# Patient Record
Sex: MALE | Race: WHITE | NOT HISPANIC OR LATINO | Employment: OTHER | URBAN - METROPOLITAN AREA
[De-identification: names, ages, dates, MRNs, and addresses within clinical notes are randomized per-mention and may not be internally consistent; named-entity substitution may affect disease eponyms.]

---

## 2018-04-27 ENCOUNTER — OFFICE VISIT (OUTPATIENT)
Dept: FAMILY MEDICINE | Facility: CLINIC | Age: 63
End: 2018-04-27
Payer: MEDICAID

## 2018-04-27 VITALS
TEMPERATURE: 97 F | OXYGEN SATURATION: 99 % | BODY MASS INDEX: 20.4 KG/M2 | WEIGHT: 130 LBS | DIASTOLIC BLOOD PRESSURE: 86 MMHG | HEART RATE: 94 BPM | SYSTOLIC BLOOD PRESSURE: 153 MMHG | HEIGHT: 67 IN | RESPIRATION RATE: 18 BRPM

## 2018-04-27 DIAGNOSIS — I10 HYPERTENSION, UNSPECIFIED TYPE: Primary | ICD-10-CM

## 2018-04-27 PROCEDURE — 99213 OFFICE O/P EST LOW 20 MIN: CPT | Mod: S$PBB,,, | Performed by: NURSE PRACTITIONER

## 2018-04-27 PROCEDURE — 99999 PR PBB SHADOW E&M-EST. PATIENT-LVL III: CPT | Mod: PBBFAC,,, | Performed by: NURSE PRACTITIONER

## 2018-04-27 PROCEDURE — 99213 OFFICE O/P EST LOW 20 MIN: CPT | Mod: PBBFAC,PN | Performed by: NURSE PRACTITIONER

## 2018-04-27 RX ORDER — LISINOPRIL 20 MG/1
20 TABLET ORAL DAILY
Qty: 90 TABLET | Refills: 3 | Status: SHIPPED | OUTPATIENT
Start: 2018-04-27 | End: 2019-08-19 | Stop reason: SDUPTHER

## 2018-04-27 NOTE — PROGRESS NOTES
Subjective:       Patient ID: Zhao Lee Jr. is a 62 y.o. male.    Chief Complaint: Follow-up (blood pressure)    Patient in clinic today with a complaint of multiple elevated blood pressure readings over the course of the last month.  He reports systolic pressures >170.        Review of Systems   Constitutional: Negative for activity change, appetite change, chills, diaphoresis, fatigue, fever and unexpected weight change.   HENT: Negative.    Eyes: Negative.    Respiratory: Negative for cough, choking, chest tightness, shortness of breath, wheezing and stridor.    Cardiovascular: Negative for chest pain, palpitations and leg swelling.   Gastrointestinal: Negative for abdominal pain, constipation, diarrhea, nausea and vomiting.   Endocrine: Negative for cold intolerance, heat intolerance, polydipsia, polyphagia and polyuria.   Genitourinary: Negative.    Musculoskeletal: Positive for arthralgias, back pain, gait problem, myalgias and neck pain. Negative for joint swelling and neck stiffness.   Skin: Positive for pallor. Negative for rash and wound.   Allergic/Immunologic: Negative.    Neurological: Negative for dizziness, tremors, syncope, weakness, light-headedness, numbness and headaches.   Hematological: Negative for adenopathy. Does not bruise/bleed easily.   Psychiatric/Behavioral: Negative.        Objective:      Physical Exam   Constitutional: He is oriented to person, place, and time. He appears well-developed.   HENT:   Head: Normocephalic and atraumatic.   Eyes: Conjunctivae are normal. Pupils are equal, round, and reactive to light.   Neck: Normal range of motion. Neck supple.   Cardiovascular: Normal rate and regular rhythm.    Pulmonary/Chest: Effort normal and breath sounds normal.   Abdominal: Soft.   Musculoskeletal:   Limited ROM secondary to chronic conditions, uses wheelchair for mobility.   Neurological: He is alert and oriented to person, place, and time.   Skin: Skin is warm and dry.  Capillary refill takes less than 2 seconds.   Psychiatric: He has a normal mood and affect. His behavior is normal.   Nursing note and vitals reviewed.      Assessment:       1. Hypertension, unspecified type        Plan:       Discussed plan of care with patient.  We will proceed with treatment, as prescribed.  Encouraged continued monitoring of his blood pressure and continued follow up with Dr. Gibson.

## 2018-05-03 ENCOUNTER — TELEPHONE (OUTPATIENT)
Dept: PODIATRY | Facility: CLINIC | Age: 63
End: 2018-05-03

## 2018-05-03 NOTE — TELEPHONE ENCOUNTER
----- Message from Dara Wilkins sent at 5/3/2018  8:44 AM CDT -----  Contact: self [phone]  Type:  Sooner Apoointment Request    Caller is requesting a sooner appointment.  Caller declined first available appointment listed below.  Caller will not accept being placed on the waitlist and is requesting a message be sent to doctor.    Name of Caller:  Self   When is the first available appointment?  05/14/2018  Symptoms:  NA  Best Call Back Number: [phone]  Additional Information:  Patient asking to be seen earlier appointment for amputation follow up .

## 2018-05-09 ENCOUNTER — OFFICE VISIT (OUTPATIENT)
Dept: PODIATRY | Facility: CLINIC | Age: 63
End: 2018-05-09
Payer: MEDICAID

## 2018-05-09 VITALS
BODY MASS INDEX: 20.4 KG/M2 | HEIGHT: 67 IN | WEIGHT: 130 LBS | TEMPERATURE: 98 F | DIASTOLIC BLOOD PRESSURE: 83 MMHG | HEART RATE: 72 BPM | SYSTOLIC BLOOD PRESSURE: 154 MMHG

## 2018-05-09 DIAGNOSIS — M19.072 DJD (DEGENERATIVE JOINT DISEASE), ANKLE AND FOOT, LEFT: ICD-10-CM

## 2018-05-09 DIAGNOSIS — L97.521 SKIN ULCER OF TOE OF LEFT FOOT, LIMITED TO BREAKDOWN OF SKIN: Primary | ICD-10-CM

## 2018-05-09 DIAGNOSIS — I73.9 PERIPHERAL VASCULAR DISEASE: ICD-10-CM

## 2018-05-09 PROCEDURE — 99213 OFFICE O/P EST LOW 20 MIN: CPT | Mod: PBBFAC | Performed by: PODIATRIST

## 2018-05-09 PROCEDURE — 99213 OFFICE O/P EST LOW 20 MIN: CPT | Mod: S$PBB,,, | Performed by: PODIATRIST

## 2018-05-09 PROCEDURE — 99999 PR PBB SHADOW E&M-EST. PATIENT-LVL III: CPT | Mod: PBBFAC,,, | Performed by: PODIATRIST

## 2018-05-09 RX ORDER — DIAZEPAM 10 MG/1
TABLET ORAL
COMMUNITY
Start: 2018-04-17 | End: 2018-06-25 | Stop reason: SDUPTHER

## 2018-05-09 RX ORDER — OXYCODONE HYDROCHLORIDE 10 MG/1
TABLET ORAL
COMMUNITY
Start: 2018-04-17 | End: 2018-10-24

## 2018-05-09 RX ORDER — DIAZEPAM 10 MG/1
TABLET ORAL
COMMUNITY
End: 2018-06-25

## 2018-05-09 RX ORDER — FENTANYL 12.5 UG/1
PATCH TRANSDERMAL
COMMUNITY
End: 2018-10-24

## 2018-05-09 RX ORDER — ESCITALOPRAM OXALATE 10 MG/1
TABLET ORAL
COMMUNITY
End: 2018-07-03 | Stop reason: SDUPTHER

## 2018-05-09 RX ORDER — GABAPENTIN 300 MG/1
CAPSULE ORAL
COMMUNITY
End: 2018-10-24 | Stop reason: SDUPTHER

## 2018-05-09 RX ORDER — VIT C/E/ZN/COPPR/LUTEIN/ZEAXAN 250MG-90MG
CAPSULE ORAL
COMMUNITY
End: 2019-04-24

## 2018-05-09 RX ORDER — SODIUM BICARBONATE 650 MG/1
650 TABLET ORAL 4 TIMES DAILY
COMMUNITY
End: 2018-10-08 | Stop reason: SDUPTHER

## 2018-05-09 NOTE — LETTER
May 12, 2018      Tobi Salvador MD  [address]  [facility] [place] [address]           [facility]  [address] [facility] [place] [address]  Phone: [phone]  Fax: [phone]          Patient: Zhao Lee Jr.   MR Number: [id number]   YOB: 1955   Date of Visit: 5/9/2018       Dear Dr. Tobi Salvador:    Thank you for referring Zhao Lee to me for evaluation. Attached you will find relevant portions of my assessment and plan of care.    If you have questions, please do not hesitate to call me. I look forward to following Zhao Lee along with you.    Sincerely,    Brad Ma, SONIA    Enclosure  CC:  No Recipients    If you would like to receive this communication electronically, please contact [email or web address] or [phone] to request more information on [facility] access.    For providers and/or their staff who would like to refer a patient to [facility], please contact us through our one-stop-shop provider referral line, [facility] [place], at [phone].    If you feel you have received this communication in error or would no longer like to receive these types of communications, please e-mail [email or web address]

## 2018-05-11 ENCOUNTER — LAB VISIT (OUTPATIENT)
Dept: LAB | Facility: HOSPITAL | Age: 63
End: 2018-05-11
Attending: FAMILY MEDICINE
Payer: MEDICAID

## 2018-05-11 ENCOUNTER — TELEPHONE (OUTPATIENT)
Dept: FAMILY MEDICINE | Facility: CLINIC | Age: 63
End: 2018-05-11

## 2018-05-11 ENCOUNTER — OFFICE VISIT (OUTPATIENT)
Dept: FAMILY MEDICINE | Facility: CLINIC | Age: 63
End: 2018-05-11
Payer: MEDICAID

## 2018-05-11 VITALS
OXYGEN SATURATION: 100 % | DIASTOLIC BLOOD PRESSURE: 79 MMHG | RESPIRATION RATE: 18 BRPM | SYSTOLIC BLOOD PRESSURE: 177 MMHG | HEART RATE: 83 BPM

## 2018-05-11 DIAGNOSIS — I10 ESSENTIAL HYPERTENSION: ICD-10-CM

## 2018-05-11 DIAGNOSIS — M47.816 SPONDYLOSIS OF LUMBAR REGION WITHOUT MYELOPATHY OR RADICULOPATHY: ICD-10-CM

## 2018-05-11 DIAGNOSIS — R39.81 FUNCTIONAL URINARY INCONTINENCE: ICD-10-CM

## 2018-05-11 DIAGNOSIS — G82.20 PARAPLEGIA: ICD-10-CM

## 2018-05-11 DIAGNOSIS — J30.1 SEASONAL ALLERGIC RHINITIS DUE TO POLLEN: Primary | ICD-10-CM

## 2018-05-11 LAB
ANION GAP SERPL CALC-SCNC: 10 MMOL/L
BASOPHILS # BLD AUTO: 0.08 K/UL
BASOPHILS NFR BLD: 1.7 %
BUN SERPL-MCNC: 27 MG/DL
CALCIUM SERPL-MCNC: 8.6 MG/DL
CHLORIDE SERPL-SCNC: 103 MMOL/L
CO2 SERPL-SCNC: 21 MMOL/L
CREAT SERPL-MCNC: 2.5 MG/DL
DIFFERENTIAL METHOD: ABNORMAL
EOSINOPHIL # BLD AUTO: 0.2 K/UL
EOSINOPHIL NFR BLD: 3.2 %
ERYTHROCYTE [DISTWIDTH] IN BLOOD BY AUTOMATED COUNT: 13.2 %
EST. GFR  (AFRICAN AMERICAN): 30.2 ML/MIN/1.73 M^2
EST. GFR  (NON AFRICAN AMERICAN): 26.2 ML/MIN/1.73 M^2
GLUCOSE SERPL-MCNC: 89 MG/DL
HCT VFR BLD AUTO: 42.9 %
HGB BLD-MCNC: 13.9 G/DL
IMM GRANULOCYTES # BLD AUTO: 0.01 K/UL
IMM GRANULOCYTES NFR BLD AUTO: 0.2 %
LYMPHOCYTES # BLD AUTO: 1.6 K/UL
LYMPHOCYTES NFR BLD: 34.4 %
MCH RBC QN AUTO: 34.1 PG
MCHC RBC AUTO-ENTMCNC: 32.4 G/DL
MCV RBC AUTO: 105 FL
MONOCYTES # BLD AUTO: 0.4 K/UL
MONOCYTES NFR BLD: 8.6 %
NEUTROPHILS # BLD AUTO: 2.5 K/UL
NEUTROPHILS NFR BLD: 51.9 %
NRBC BLD-RTO: 0 /100 WBC
PLATELET # BLD AUTO: 268 K/UL
PMV BLD AUTO: 9.2 FL
POTASSIUM SERPL-SCNC: 4.7 MMOL/L
RBC # BLD AUTO: 4.08 M/UL
SODIUM SERPL-SCNC: 134 MMOL/L
WBC # BLD AUTO: 4.74 K/UL

## 2018-05-11 PROCEDURE — 99999 PR PBB SHADOW E&M-EST. PATIENT-LVL III: CPT | Mod: PBBFAC,,, | Performed by: FAMILY MEDICINE

## 2018-05-11 PROCEDURE — 36415 COLL VENOUS BLD VENIPUNCTURE: CPT

## 2018-05-11 PROCEDURE — 99214 OFFICE O/P EST MOD 30 MIN: CPT | Mod: ,,, | Performed by: FAMILY MEDICINE

## 2018-05-11 PROCEDURE — 80048 BASIC METABOLIC PNL TOTAL CA: CPT

## 2018-05-11 PROCEDURE — 85025 COMPLETE CBC W/AUTO DIFF WBC: CPT

## 2018-05-11 RX ORDER — FLUTICASONE PROPIONATE 50 MCG
1 SPRAY, SUSPENSION (ML) NASAL DAILY
Qty: 1 BOTTLE | Refills: 11 | Status: SHIPPED | OUTPATIENT
Start: 2018-05-11 | End: 2018-10-24

## 2018-05-11 NOTE — TELEPHONE ENCOUNTER
----- Message from Dara Wilkins sent at 5/11/2018 12:10 PM CDT -----  Contact: [employer]- [place] [phone]  Type: Needs Medical Advice    Who Called:  [employer] [employer]  Symptoms (please be specific):  NA How long has patient had these symptoms:  NA   Pharmacy name and phone #:    Best Call Back Number: [phone] Information: the representative need diagnosis code for catheter supplies. Patient has been out of catheter supplies since 05/09/2018

## 2018-05-11 NOTE — PROGRESS NOTES
Subjective:       Patient ID: Zhao Lee Jr. is a 62 y.o. male.    Chief Complaint: Follow-up (pain management referral ); Tinnitus; and Nasal Congestion    In regards to the patient's hypertension, patient denies chest pain/sob, and has not been compliant with the medication regimen due to the following: forgot to take medicine.    Also complains of AR, not controlled with OTC meds.     Also complains of urinary incontinence due to spinal injury, in and out caths. Uses size 14 catheter, due to paraplegia.      Review of Systems   Constitutional: Negative for activity change, appetite change, fatigue and fever.   HENT: Positive for postnasal drip and rhinorrhea. Negative for congestion, dental problem, ear discharge, hearing loss, sinus pain, sneezing and trouble swallowing.    Eyes: Negative for photophobia and discharge.   Respiratory: Positive for cough. Negative for apnea and shortness of breath.    Cardiovascular: Negative for chest pain.   Gastrointestinal: Negative for abdominal distention, abdominal pain, blood in stool, diarrhea, nausea and vomiting.   Endocrine: Negative for cold intolerance.   Genitourinary: Negative for difficulty urinating, flank pain, frequency, hematuria and testicular pain.   Musculoskeletal: Positive for arthralgias, back pain and myalgias.   Skin: Negative for color change.   Allergic/Immunologic: Negative for environmental allergies, food allergies and immunocompromised state.   Neurological: Positive for numbness. Negative for dizziness, syncope and headaches.   Hematological: Negative for adenopathy. Does not bruise/bleed easily.   Psychiatric/Behavioral: Negative for agitation, confusion, decreased concentration, hallucinations, self-injury and sleep disturbance.   All other systems reviewed and are negative.        Reviewed family, medical, surgical, and social history.    Objective:      BP (!) 177/79 (BP Location: Left arm, Patient Position: Sitting)   Pulse 83   Resp  18   SpO2 100%   Physical Exam   Constitutional: He is oriented to person, place, and time. No distress.   HENT:   Head: Normocephalic and atraumatic.   Nose: Nose normal.   Mouth/Throat: Oropharynx is clear and moist. No oropharyngeal exudate.   Eyes: Conjunctivae and EOM are normal. Pupils are equal, round, and reactive to light.   Neck: Normal range of motion. No thyromegaly present.   Cardiovascular: Normal rate, regular rhythm, normal heart sounds and intact distal pulses.  Exam reveals no gallop and no friction rub.    No murmur heard.  Pulmonary/Chest: Effort normal. No respiratory distress. He has wheezes. He has no rales.   Abdominal: Soft. He exhibits no distension. There is no tenderness. There is no guarding.   Musculoskeletal: Normal range of motion. He exhibits tenderness and deformity. He exhibits no edema.   Neurological: He is alert and oriented to person, place, and time. He displays normal reflexes. No cranial nerve deficit or sensory deficit. He exhibits normal muscle tone. Coordination normal.   Skin: Skin is warm and dry. No rash noted. He is not diaphoretic. No erythema. No pallor.   Psychiatric: He has a normal mood and affect. His behavior is normal. Judgment and thought content normal.   Nursing note and vitals reviewed.      Assessment:       1. Seasonal allergic rhinitis due to pollen    2. Spondylosis of lumbar region without myelopathy or radiculopathy    3. Essential hypertension    4. Paraplegia    5. Functional urinary incontinence        Plan:       Seasonal allergic rhinitis due to pollen  -     fluticasone (FLONASE) 50 mcg/actuation nasal spray; 1 spray (50 mcg total) by Each Nare route once daily.  Dispense: 1 Bottle; Refill: 11    Spondylosis of lumbar region without myelopathy or radiculopathy  -     Ambulatory referral to Pain Clinic    Essential hypertension  -     CBC auto differential; Future; Expected date: 05/11/2018  -     Basic metabolic panel; Future; Expected date:  05/11/2018  - Encouraged compliance with regimen, and to recheck blood pressure at home.            Risks, benefits, and side effects were discussed with the patient. All questions were answered to the fullest satisfaction of the patient, and pt verbalized understanding and agreement to treatment plan. Pt was to call with any new or worsening symptoms, or present to the ER.
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
## 2018-05-14 ENCOUNTER — TELEPHONE (OUTPATIENT)
Dept: FAMILY MEDICINE | Facility: CLINIC | Age: 63
End: 2018-05-14

## 2018-05-14 NOTE — TELEPHONE ENCOUNTER
----- Message from Kenneth Smith sent at 5/14/2018 10:24 AM CDT -----  Contact: Arabella/Dr. Pascual Bell called in regarding a referral that was sent to her office and would like a call back from nurse because she has some questions regarding this.  Arabella's call back number is [phone]

## 2018-05-18 ENCOUNTER — NURSE TRIAGE (OUTPATIENT)
Dept: ADMINISTRATIVE | Facility: CLINIC | Age: 63
End: 2018-05-18

## 2018-05-18 NOTE — TELEPHONE ENCOUNTER
Reason for Disposition   BP > 160 / 100 and any cardiac or neurologic symptoms (e.g., chest pain, difficulty breathing, unsteady gait, blurred vision)    Protocols used: ST HIGH BLOOD PRESSURE-A-OH    Pt c/o high BP for the last couple of months. 180s/149 last night. Pt c/o headache and ringing in his ears. Care advice given.

## 2018-05-22 ENCOUNTER — TELEPHONE (OUTPATIENT)
Dept: FAMILY MEDICINE | Facility: CLINIC | Age: 63
End: 2018-05-22

## 2018-05-22 NOTE — TELEPHONE ENCOUNTER
Rubia with [facility] sent a fax that stated they need a discussion in clinical notes of why reed matta needs catheter and what size caths, for insurance coverage of his catheter supplies.  ThanksDee Dee

## 2018-05-22 NOTE — TELEPHONE ENCOUNTER
Returned patient's call.  He requested Dr. Agee's contact information and this was given to him.  Dee Dee

## 2018-05-22 NOTE — TELEPHONE ENCOUNTER
----- Message from Callie Carr sent at 5/22/2018 10:40 AM CDT -----  Contact: Pt  Type: Needs Medical Advice    Who Called:  Zhao Lee  Symptoms (please be specific):  n/a  How long has patient had these symptoms:  n/a  Pharmacy name and phone #:  n/a  Best Call Back Number:   Additional Information: Pt is calling to speak with nurse regarding a few questions about his referral. Please call back and advise.

## 2018-05-23 NOTE — TELEPHONE ENCOUNTER
I called [facility] and they do not have this information.  I called patient and he stated that he uses size 14 catheters.  Dee Dee

## 2018-05-25 ENCOUNTER — TELEPHONE (OUTPATIENT)
Dept: FAMILY MEDICINE | Facility: CLINIC | Age: 63
End: 2018-05-25

## 2018-05-25 NOTE — TELEPHONE ENCOUNTER
----- Message from Dara Wilkins sent at 5/25/2018 11:24 AM CDT -----  Contact: [facility]- [place] [phone]-patient ID-  [id number]  Type: Needs Medical Advice    Who Called:  [facility]   Symptoms (please be specific):  How long has patient had these symptoms:   Pharmacy name and phone #:  [facility] Best Call Back Number: [phone] Additional Information: Patient request an order for knee or back brace. The order was faxed to the doctor's office

## 2018-05-28 ENCOUNTER — TELEPHONE (OUTPATIENT)
Dept: FAMILY MEDICINE | Facility: CLINIC | Age: 63
End: 2018-05-28

## 2018-05-28 NOTE — TELEPHONE ENCOUNTER
----- Message from Jelena Moulton sent at 5/25/2018  1:01 PM CDT -----  Type: Needs Medical Advice    Who Called:  Rubia Lassiter Call Back Number: [facility] at [phone]  Additional Information: Stated needs to speak with nurse concerning discrepancy with catheter request and needs to know how many patient uses in a day.Please call back.

## 2018-05-29 NOTE — TELEPHONE ENCOUNTER
Spoke with pt and gave him the number of the call center/scheduling.    Spoke with [facility], told them that pt uses 5-6 in/out caths per day.  Dee Dee

## 2018-05-29 NOTE — TELEPHONE ENCOUNTER
----- Message from Theresa Rodriguez sent at 5/29/2018  2:41 PM CDT -----  called rg status of pain management referral. also having hi b/p (headaches)....[phone]

## 2018-05-30 ENCOUNTER — TELEPHONE (OUTPATIENT)
Dept: FAMILY MEDICINE | Facility: CLINIC | Age: 63
End: 2018-05-30

## 2018-05-30 NOTE — TELEPHONE ENCOUNTER
----- Message from Dominique Matos sent at 5/30/2018  2:47 PM CDT -----  Type:  Pharmacy Calling to Clarify an RX    Name of Caller:  Keysha with [employer] at [phone]   Additional Information: Patient ID# [id number], calling about the patients brace requested needing an update.

## 2018-06-06 ENCOUNTER — TELEPHONE (OUTPATIENT)
Dept: FAMILY MEDICINE | Facility: CLINIC | Age: 63
End: 2018-06-06

## 2018-06-06 NOTE — TELEPHONE ENCOUNTER
----- Message from Itz Cloud sent at 6/6/2018  3:44 PM CDT -----  Contact: Pt  Name of Who is Calling: Zhao      What is the request in detail: Pt is calling requesting to speak with a nurse in regards to medications      Can the clinic reply by [facility]: no      What Number to Call Back if not in [facility]: [phone] (home)

## 2018-06-07 ENCOUNTER — TELEPHONE (OUTPATIENT)
Dept: FAMILY MEDICINE | Facility: CLINIC | Age: 63
End: 2018-06-07

## 2018-06-07 NOTE — TELEPHONE ENCOUNTER
----- Message from Callie Carr sent at 6/7/2018  2:43 PM CDT -----  Contact: PT  Type: Needs Medical Advice    Who Called:  Zhao Lee   Symptoms (please be specific):  n/a  How long has patient had these symptoms:  N/a  Pharmacy name and phone #:  n/a  Best Call Back Number:   Additional Information:  Pt is calling to speak with doctor, he was advised that the referral will need a face sheet and records of his last two appts. Please call back and advise.    #[phone]

## 2018-06-14 ENCOUNTER — TELEPHONE (OUTPATIENT)
Dept: FAMILY MEDICINE | Facility: CLINIC | Age: 63
End: 2018-06-14

## 2018-06-14 NOTE — TELEPHONE ENCOUNTER
----- Message from Callie Carr sent at 6/14/2018  9:06 AM CDT -----  Contact: PT  Type: Needs Medical Advice    Who Called:  Zhao Lee   Symptoms (please be specific):  n/a  How long has patient had these symptoms:  n/a  Pharmacy name and phone #:  n/a  Best Call Back Number:     Additional Information:  PT is calling to speak with nurse regarding seeing his doctor sooner. Please call back and advise.

## 2018-06-14 NOTE — TELEPHONE ENCOUNTER
----- Message from Shanta Mckenzie sent at 6/14/2018  9:34 AM CDT -----  Returning your call.  Please call [phone].

## 2018-06-18 ENCOUNTER — TELEPHONE (OUTPATIENT)
Dept: FAMILY MEDICINE | Facility: CLINIC | Age: 63
End: 2018-06-18

## 2018-06-18 NOTE — TELEPHONE ENCOUNTER
"Pt would like to see Dr. Gibson.  He stated that he has not yet been to see Dr. Agee, as the records that were requested by Dr. Agee's office that were sent from this office "went to a cabinet shop" instead so the appt has not been made for him.  I stated that we will resend the records to Dr. Agee's office and offered him the next available appt, which he declined, stating, "I will get my pills off the street."   Justine"

## 2018-06-18 NOTE — TELEPHONE ENCOUNTER
----- Message from Donna Marroquin sent at 6/18/2018 12:17 PM CDT -----  Contact: [phone]  Patient requesting a call from nurse, not getting the help he need, plan to leave dr chavez, haven't been able to get anywhere with pain management.  Please call patient at [phone]. Thanks!

## 2018-06-21 ENCOUNTER — TELEPHONE (OUTPATIENT)
Dept: FAMILY MEDICINE | Facility: CLINIC | Age: 63
End: 2018-06-21

## 2018-06-21 NOTE — TELEPHONE ENCOUNTER
----- Message from Jelena Moulton sent at 6/21/2018  3:39 PM CDT -----  Type:  Medical Supply calling back concerning request    Name of Caller:  Lakshmi  Pharmacy Name:  [facility]  Prescription Name:    What do they need to clarify?:   Best Call Back Number:  [phone] ext [phone]  Additional Information:  Stated has not received paperwork with doctor's initials and signature for Catheter Supplies/please fax back or call if any questions.

## 2018-06-21 NOTE — TELEPHONE ENCOUNTER
I have spoken with Lakshmi and let her know that we have not [facility].  On checking, they have the wrong fax number and she was provided with the correct fax #.  Justine

## 2018-06-25 ENCOUNTER — TELEPHONE (OUTPATIENT)
Dept: FAMILY MEDICINE | Facility: CLINIC | Age: 63
End: 2018-06-25

## 2018-06-25 ENCOUNTER — OFFICE VISIT (OUTPATIENT)
Dept: FAMILY MEDICINE | Facility: CLINIC | Age: 63
End: 2018-06-25
Payer: MEDICARE

## 2018-06-25 VITALS
RESPIRATION RATE: 20 BRPM | HEART RATE: 71 BPM | OXYGEN SATURATION: 100 % | DIASTOLIC BLOOD PRESSURE: 94 MMHG | SYSTOLIC BLOOD PRESSURE: 175 MMHG

## 2018-06-25 DIAGNOSIS — I10 ESSENTIAL HYPERTENSION: ICD-10-CM

## 2018-06-25 DIAGNOSIS — F11.93 OPIOID WITHDRAWAL: ICD-10-CM

## 2018-06-25 DIAGNOSIS — F41.1 GAD (GENERALIZED ANXIETY DISORDER): Primary | ICD-10-CM

## 2018-06-25 PROCEDURE — 99214 OFFICE O/P EST MOD 30 MIN: CPT | Mod: S$GLB,,, | Performed by: FAMILY MEDICINE

## 2018-06-25 PROCEDURE — 99999 PR PBB SHADOW E&M-EST. PATIENT-LVL III: CPT | Mod: PBBFAC,,, | Performed by: FAMILY MEDICINE

## 2018-06-25 PROCEDURE — 99213 OFFICE O/P EST LOW 20 MIN: CPT | Mod: PBBFAC,PN | Performed by: FAMILY MEDICINE

## 2018-06-25 RX ORDER — CLONIDINE HYDROCHLORIDE 0.1 MG/1
0.1 TABLET ORAL 2 TIMES DAILY
Qty: 60 TABLET | Refills: 2 | Status: SHIPPED | OUTPATIENT
Start: 2018-06-25 | End: 2018-08-28 | Stop reason: SDUPTHER

## 2018-06-25 RX ORDER — DIAZEPAM 10 MG/1
10 TABLET ORAL 2 TIMES DAILY PRN
Qty: 28 TABLET | Refills: 0 | Status: SHIPPED | OUTPATIENT
Start: 2018-06-25 | End: 2018-10-17 | Stop reason: SDUPTHER

## 2018-06-25 NOTE — PROGRESS NOTES
Subjective:       Patient ID: Zhao Lee Jr. is a 62 y.o. male.    Chief Complaint: Constipation; Shortness of Breath; and Headache    Reports having some withdrawal syptoms from opiate cessation, headache, diarrhea, constipation, etc. No CP/SOB, still working on appt with pain management in [place].      Review of Systems   Constitutional: Negative for activity change, appetite change, fatigue and fever.   HENT: Negative for congestion, dental problem, ear discharge, hearing loss, postnasal drip, sinus pain, sneezing and trouble swallowing.    Eyes: Negative for photophobia and discharge.   Respiratory: Negative for apnea, cough and shortness of breath.    Cardiovascular: Negative for chest pain.   Gastrointestinal: Negative for abdominal distention, abdominal pain, blood in stool, diarrhea, nausea and vomiting.   Endocrine: Negative for cold intolerance.   Genitourinary: Negative for difficulty urinating, flank pain, frequency, hematuria and testicular pain.   Musculoskeletal: Positive for arthralgias and back pain. Negative for myalgias.   Skin: Negative for color change.   Allergic/Immunologic: Negative for environmental allergies, food allergies and immunocompromised state.   Neurological: Negative for dizziness, syncope, numbness and headaches.   Hematological: Negative for adenopathy. Does not bruise/bleed easily.   Psychiatric/Behavioral: Positive for agitation. Negative for confusion, decreased concentration, hallucinations, self-injury and sleep disturbance.   All other systems reviewed and are negative.        Reviewed family, medical, surgical, and social history.    Objective:      BP (!) 175/94 (BP Location: Right arm, Patient Position: Sitting, BP Method: Medium (Automatic))   Pulse 71   Resp 20   SpO2 100%   Physical Exam   Constitutional: He is oriented to person, place, and time. No distress.   HENT:   Head: Normocephalic and atraumatic.   Nose: Nose normal.   Mouth/Throat: Oropharynx is clear  and moist. No oropharyngeal exudate.   Eyes: Conjunctivae and EOM are normal. Pupils are equal, round, and reactive to light.   Neck: Normal range of motion. No thyromegaly present.   Cardiovascular: Normal rate, regular rhythm, normal heart sounds and intact distal pulses.  Exam reveals no gallop and no friction rub.    No murmur heard.  Pulmonary/Chest: Effort normal and breath sounds normal. No respiratory distress. He has no wheezes. He has no rales.   Abdominal: Soft. He exhibits no distension. There is no tenderness. There is no guarding.   Musculoskeletal: Normal range of motion. He exhibits tenderness and deformity. He exhibits no edema.   Neurological: He is alert and oriented to person, place, and time. He displays normal reflexes. No cranial nerve deficit or sensory deficit. He exhibits normal muscle tone. Coordination normal.   Skin: Skin is warm and dry. No rash noted. He is not diaphoretic. No erythema. No pallor.   Psychiatric: He has a normal mood and affect. His behavior is normal. Judgment and thought content normal.   Nursing note and vitals reviewed.      Assessment:       1. GARETT (generalized anxiety disorder)    2. Essential hypertension    3. Opioid withdrawal        Plan:       GARETT (generalized anxiety disorder)  -     diazePAM (VALIUM) 10 MG Tab; Take 1 tablet (10 mg total) by mouth 2 (two) times daily as needed (anxiety).  Dispense: 28 tablet; Refill: 0    Essential hypertension    Opioid withdrawal    Other orders  -     cloNIDine (CATAPRES) 0.1 MG tablet; Take 1 tablet (0.1 mg total) by mouth 2 (two) times daily.  Dispense: 60 tablet; Refill: 2    Two week course of diazepam while note taking opiates, along with clonidine, to help with withdrawals, follow up with pain management for long term discussion of pain control.        Risks, benefits, and side effects were discussed with the patient. All questions were answered to the fullest satisfaction of the patient, and pt verbalized  understanding and agreement to treatment plan. Pt was to call with any new or worsening symptoms, or present to the ER.

## 2018-06-25 NOTE — TELEPHONE ENCOUNTER
Spoke with Dajuan at [facility] and they have received patient's prescriptions; they were still at the printer when pt was there to  his prescriptions.  Pt informed.   Dee Dee

## 2018-06-25 NOTE — TELEPHONE ENCOUNTER
----- Message from Deann Diaz sent at 6/25/2018 12:00 PM CDT -----  Contact: pt [phone]  Patient called he is at [facility] and they have not received the prescriptions and he is upset. The patient is asking if you will call the two mediations in as soon as possible.the patient states he has to travel too far to get to the pharmacy.

## 2018-07-02 ENCOUNTER — TELEPHONE (OUTPATIENT)
Dept: FAMILY MEDICINE | Facility: CLINIC | Age: 63
End: 2018-07-02

## 2018-07-02 DIAGNOSIS — G62.9 NEUROPATHY: Primary | ICD-10-CM

## 2018-07-02 NOTE — TELEPHONE ENCOUNTER
----- Message from Adrienne Klein sent at 7/2/2018 11:29 AM CDT -----  Contact: self  Patient need to speak to someone regarding getting prosiest for right leg and walking sticks please call patient with questions [phone]      Please call   [facility] - [place] - [address]  [address] [place] [address]  Phone: [phone] Fax: [phone]

## 2018-07-02 NOTE — TELEPHONE ENCOUNTER
Spoke to pt, he wanted to let you know that  [facility] Will be contacting you about prosthetics and a walking stick. Pt would like a referral to see a neurologist, he has no preference for who he would like to see, but would like it to be in [place].

## 2018-07-03 RX ORDER — ESCITALOPRAM OXALATE 10 MG/1
10 TABLET ORAL DAILY
Qty: 90 TABLET | Refills: 3 | Status: SHIPPED | OUTPATIENT
Start: 2018-07-03 | End: 2018-10-24

## 2018-07-03 NOTE — TELEPHONE ENCOUNTER
----- Message from Michaela Gomez sent at 7/3/2018  9:46 AM CDT -----  Type:  RX Refill Request    Who Called:  patient  Refill or New Rx:  refill  RX Name and Strength: escitalopram oxalate (LEXAPRO) 10 MG tablet  How is the patient currently taking it? (ex. 1XDay):  Na  Is this a 30 day or 90 day RX: 90  Preferred Pharmacy with phone number:   [facility] - [place] - [address] [address]  Phone: [phone] Fax: [phone]    Local or Mail Order: local  Ordering Provider: Arthur Lassiter Call Back Number: [phone] (home)     Additional Information: Stating is low on his medications

## 2018-07-12 ENCOUNTER — OFFICE VISIT (OUTPATIENT)
Dept: FAMILY MEDICINE | Facility: CLINIC | Age: 63
End: 2018-07-12
Payer: MEDICARE

## 2018-07-12 ENCOUNTER — TELEPHONE (OUTPATIENT)
Dept: FAMILY MEDICINE | Facility: CLINIC | Age: 63
End: 2018-07-12

## 2018-07-12 VITALS
WEIGHT: 124 LBS | BODY MASS INDEX: 19.46 KG/M2 | SYSTOLIC BLOOD PRESSURE: 147 MMHG | HEART RATE: 98 BPM | TEMPERATURE: 97 F | HEIGHT: 67 IN | DIASTOLIC BLOOD PRESSURE: 67 MMHG

## 2018-07-12 DIAGNOSIS — G89.4 CHRONIC PAIN SYNDROME: Primary | ICD-10-CM

## 2018-07-12 PROCEDURE — 99213 OFFICE O/P EST LOW 20 MIN: CPT | Mod: PBBFAC,PN | Performed by: FAMILY MEDICINE

## 2018-07-12 PROCEDURE — 99213 OFFICE O/P EST LOW 20 MIN: CPT | Mod: S$GLB,,, | Performed by: FAMILY MEDICINE

## 2018-07-12 PROCEDURE — 99999 PR PBB SHADOW E&M-EST. PATIENT-LVL III: CPT | Mod: PBBFAC,,, | Performed by: FAMILY MEDICINE

## 2018-07-12 NOTE — TELEPHONE ENCOUNTER
Called back, person I spoke with was unable t pull up their sent message, she will cb with any questions

## 2018-07-12 NOTE — PROGRESS NOTES
"Subjective:       Patient ID: Zhao Lee Jr. is a 62 y.o. male.    Chief Complaint: No chief complaint on file.    Pt given a  appt which is in error - he is here for CC of opiate dependency/withdrawal and is requesting Oxycodone and/or Valium.  He was referred to a new, [facility] pain mgt center but reports the referral request/records did not get to that provider.  He also needs a handicapped parking request form.      Review of Systems   Constitutional: Positive for unexpected weight change (losing weight).   Musculoskeletal:        Sore "lump" on his buttocks area; s/p old Ortho surgery to pelvis       Objective:      Physical Exam   Constitutional: He appears well-developed. No distress.   Cardiovascular: Normal rate and regular rhythm.    No murmur heard.  Pulmonary/Chest: Effort normal and breath sounds normal.   Musculoskeletal:        Legs:      Assessment:       No diagnosis found.    Plan:         Diagnoses and all orders for this visit:    Chronic pain syndrome    Pt advised I could not Rx chronic pain meds; verbal consult with Dr Gibson done and will re-refer pt to chronic pain management. Handicap parking request form completed.  "

## 2018-07-25 ENCOUNTER — TELEPHONE (OUTPATIENT)
Dept: FAMILY MEDICINE | Facility: CLINIC | Age: 63
End: 2018-07-25

## 2018-07-25 ENCOUNTER — OFFICE VISIT (OUTPATIENT)
Dept: PAIN MEDICINE | Facility: CLINIC | Age: 63
End: 2018-07-25
Payer: MEDICARE

## 2018-07-25 VITALS
WEIGHT: 123.88 LBS | SYSTOLIC BLOOD PRESSURE: 197 MMHG | HEART RATE: 53 BPM | BODY MASS INDEX: 19.44 KG/M2 | DIASTOLIC BLOOD PRESSURE: 72 MMHG | HEIGHT: 67 IN

## 2018-07-25 DIAGNOSIS — G89.4 CHRONIC PAIN DISORDER: ICD-10-CM

## 2018-07-25 DIAGNOSIS — M47.896 OTHER SPONDYLOSIS, LUMBAR REGION: ICD-10-CM

## 2018-07-25 DIAGNOSIS — M51.36 DDD (DEGENERATIVE DISC DISEASE), LUMBAR: ICD-10-CM

## 2018-07-25 DIAGNOSIS — F11.20 UNCOMPLICATED OPIOID DEPENDENCE: Primary | ICD-10-CM

## 2018-07-25 PROCEDURE — 99204 OFFICE O/P NEW MOD 45 MIN: CPT | Mod: S$GLB,,, | Performed by: ANESTHESIOLOGY

## 2018-07-25 PROCEDURE — 99999 PR PBB SHADOW E&M-EST. PATIENT-LVL III: CPT | Mod: PBBFAC,,, | Performed by: ANESTHESIOLOGY

## 2018-07-25 PROCEDURE — 99213 OFFICE O/P EST LOW 20 MIN: CPT | Mod: PBBFAC,PN | Performed by: ANESTHESIOLOGY
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
## 2018-07-25 NOTE — TELEPHONE ENCOUNTER
Spoke with manager, Keily, @ [facility].  I once again explained to this company that we do not want to receive these faxes or phone calls; Dr. Gibson has not ordered these creams on the numerous patients that we receive the correspondence on and that I will contact the [facility], Medicare, and/or our legal department should we continue to receive the phone calls and faxes from them.  Keily stated that she will block our fax number and research by Dr. Jaskaran Gibson' name any patients that are in their system and deactivate them.  Justine

## 2018-07-25 NOTE — TELEPHONE ENCOUNTER
----- Message from Sara Jackson sent at 7/25/2018  9:58 AM CDT -----  Contact: Philip with [facility]  Type:  Pharmacy Calling to Clarify an RX    Name of Caller:  Philip  Pharmacy Name:  [facility]  Prescription Name:    Omega-3 1000MG  Diclofenac Solution 1.5%  Lidocaine 5% ointment   What do they need to clarify?:    Best Call Back Number:    Additional Information:  They are calling to check the status of the prescription authorizations for the prescriptions listed and what is the turn around time on them.

## 2018-08-16 ENCOUNTER — TELEPHONE (OUTPATIENT)
Dept: FAMILY MEDICINE | Facility: CLINIC | Age: 63
End: 2018-08-16

## 2018-08-16 NOTE — TELEPHONE ENCOUNTER
----- Message from Adrienne Klein sent at 8/16/2018  3:29 PM CDT -----  Contact:  Ayana from medicaid belle Piña from medicaid belle need to   speak to nurse regarding if office received medicaid  Belle was sent to office on 8/2    Please call to advice [phone]

## 2018-08-24 ENCOUNTER — TELEPHONE (OUTPATIENT)
Dept: FAMILY MEDICINE | Facility: CLINIC | Age: 63
End: 2018-08-24

## 2018-08-24 NOTE — TELEPHONE ENCOUNTER
----- Message from Dyan Chaudhry sent at 8/23/2018  3:50 PM CDT -----  Contact: SHADIA Delacruz needs to speak to the nurse about patient     Please call back [phone]

## 2018-08-24 NOTE — TELEPHONE ENCOUNTER
----- Message from Itz Cloud sent at 8/24/2018 10:01 AM CDT -----  Contact: Jose (brother)  Name of Who is Calling: Jose      What is the request in detail: Jose is calling requesting to speak with a nurse in regards to the patient needing to see a weight lost specialist       Can the clinic reply by [facility]: no      What Number to Call Back if not in [facility]: Jose [phone]

## 2018-08-24 NOTE — TELEPHONE ENCOUNTER
I have spoken with Sharri at [facility].  She stated pt needs a  new prosthetic limb.  It was discussed that this office has no knowledge of this, pt has not inquired or requested a new prosthesis.  Their office did receive notes from a physician at [facility] neurology dept  yesterday and I suggested that she contact that office and to call us back should she need notes from this office. Justine

## 2018-08-28 RX ORDER — CLONIDINE HYDROCHLORIDE 0.1 MG/1
0.1 TABLET ORAL 2 TIMES DAILY
Qty: 60 TABLET | Refills: 2 | Status: SHIPPED | OUTPATIENT
Start: 2018-08-28 | End: 2018-10-08 | Stop reason: SDUPTHER

## 2018-08-28 NOTE — TELEPHONE ENCOUNTER
----- Message from Nathaniel Gill sent at 8/28/2018 11:19 AM CDT -----  Contact: Patient  Zhao, [phone]. Requesting refill for sodium bicarbonate 650 MG tablet and cloNIDine (CATAPRES) 0.1 MG tablet. Please advise. Thanks.    [address] [place] [address]  Phone: [phone]

## 2018-10-02 ENCOUNTER — TELEPHONE (OUTPATIENT)
Dept: FAMILY MEDICINE | Facility: CLINIC | Age: 63
End: 2018-10-02

## 2018-10-02 NOTE — TELEPHONE ENCOUNTER
----- Message from Alyson Resendez sent at 10/2/2018  4:10 PM CDT -----  Contact: pt  Pt calling states that he needs to see the Dr sooner than 11/20 for mobility exam /wheelchair broken so needs exam to get it fitted...[phone] (home)

## 2018-10-08 ENCOUNTER — OFFICE VISIT (OUTPATIENT)
Dept: FAMILY MEDICINE | Facility: CLINIC | Age: 63
End: 2018-10-08
Payer: MEDICARE

## 2018-10-08 VITALS
DIASTOLIC BLOOD PRESSURE: 77 MMHG | SYSTOLIC BLOOD PRESSURE: 142 MMHG | OXYGEN SATURATION: 100 % | HEART RATE: 81 BPM | RESPIRATION RATE: 20 BRPM

## 2018-10-08 DIAGNOSIS — N39.0 URINARY TRACT INFECTION WITHOUT HEMATURIA, SITE UNSPECIFIED: ICD-10-CM

## 2018-10-08 DIAGNOSIS — E87.1 HYPONATREMIA: ICD-10-CM

## 2018-10-08 DIAGNOSIS — I10 ESSENTIAL HYPERTENSION: Primary | ICD-10-CM

## 2018-10-08 DIAGNOSIS — S78.119A UNILATERAL AKA: ICD-10-CM

## 2018-10-08 PROCEDURE — 99213 OFFICE O/P EST LOW 20 MIN: CPT | Mod: PBBFAC,PN | Performed by: FAMILY MEDICINE

## 2018-10-08 PROCEDURE — 99999 PR PBB SHADOW E&M-EST. PATIENT-LVL III: CPT | Mod: PBBFAC,,, | Performed by: FAMILY MEDICINE

## 2018-10-08 PROCEDURE — 99214 OFFICE O/P EST MOD 30 MIN: CPT | Mod: S$PBB,,, | Performed by: FAMILY MEDICINE

## 2018-10-08 PROCEDURE — 99496 TRANSJ CARE MGMT HIGH F2F 7D: CPT | Mod: PBBFAC,PN | Performed by: FAMILY MEDICINE

## 2018-10-08 RX ORDER — CLONIDINE HYDROCHLORIDE 0.1 MG/1
0.1 TABLET ORAL 2 TIMES DAILY
Qty: 60 TABLET | Refills: 2 | Status: SHIPPED | OUTPATIENT
Start: 2018-10-08 | End: 2019-08-19 | Stop reason: SDUPTHER

## 2018-10-08 RX ORDER — AMPICILLIN 500 MG/1
500 CAPSULE ORAL EVERY 6 HOURS
Qty: 40 CAPSULE | Refills: 0 | Status: SHIPPED | OUTPATIENT
Start: 2018-10-08 | End: 2018-10-18

## 2018-10-08 RX ORDER — SODIUM BICARBONATE 650 MG/1
650 TABLET ORAL 4 TIMES DAILY
Qty: 120 TABLET | COMMUNITY
Start: 2018-10-08 | End: 2018-10-17 | Stop reason: SDUPTHER

## 2018-10-08 NOTE — PROGRESS NOTES
"Subjective:       Patient ID: Zhao Lee Jr. is a 62 y.o. male.    Chief Complaint: Follow-up (Hospital ([facility]) follow up, would like order for new wheelchair) and Medication Refill    TTC visit    3 day stay at [facility] for  UTI  Bronchitis  Fall  Seizure like activity    All improving  Still having difficulty completing ADL's at home  No fever  Abx were not at pharmacy  Otherwise in good spirits and feeling better    Patient was contacted within 48 hours of discharge for appt  MED rec done today      Review of Systems   Constitutional: Negative for activity change, appetite change, fatigue, fever and unexpected weight change (losing weight).   HENT: Negative for congestion, dental problem, ear discharge, hearing loss, postnasal drip, sinus pain, sneezing and trouble swallowing.    Eyes: Negative for photophobia and discharge.   Respiratory: Negative for apnea, cough and shortness of breath.    Cardiovascular: Negative for chest pain.   Gastrointestinal: Negative for abdominal distention, abdominal pain, blood in stool, diarrhea, nausea and vomiting.   Endocrine: Negative for cold intolerance.   Genitourinary: Negative for difficulty urinating, flank pain, frequency, hematuria and testicular pain.   Musculoskeletal: Positive for arthralgias, back pain and gait problem. Negative for myalgias.        Sore "lump" on his buttocks area; s/p old Ortho surgery to pelvis   Skin: Negative for color change.   Allergic/Immunologic: Negative for environmental allergies, food allergies and immunocompromised state.   Neurological: Negative for dizziness, syncope, numbness and headaches.   Hematological: Negative for adenopathy. Does not bruise/bleed easily.   Psychiatric/Behavioral: Negative for agitation, confusion, decreased concentration, hallucinations, self-injury and sleep disturbance.   All other systems reviewed and are negative.        Reviewed family, medical, surgical, and social history.    Objective:      BP " (!) 142/77 (BP Location: Right arm, Patient Position: Sitting, BP Method: Medium (Automatic))   Pulse 81   Resp 20   SpO2 100%   Physical Exam   Constitutional: He is oriented to person, place, and time. He appears well-developed and well-nourished. No distress.   HENT:   Head: Normocephalic and atraumatic.   Nose: Nose normal.   Mouth/Throat: Oropharynx is clear and moist. No oropharyngeal exudate.   Eyes: Conjunctivae and EOM are normal. Pupils are equal, round, and reactive to light.   Neck: Normal range of motion. No thyromegaly present.   Cardiovascular: Normal rate, regular rhythm, normal heart sounds and intact distal pulses. Exam reveals no gallop and no friction rub.   No murmur heard.  Pulmonary/Chest: Effort normal and breath sounds normal. No respiratory distress. He has no wheezes. He has no rales.   Abdominal: Soft. He exhibits no distension. There is no tenderness. There is no guarding.   Musculoskeletal: Normal range of motion. He exhibits tenderness and deformity. He exhibits no edema.        Legs:  Neurological: He is alert and oriented to person, place, and time. He displays normal reflexes. No cranial nerve deficit or sensory deficit. He exhibits normal muscle tone. Coordination abnormal.   Skin: Skin is warm and dry. No rash noted. He is not diaphoretic. No erythema. No pallor.   Psychiatric: He has a normal mood and affect. His behavior is normal. Judgment and thought content normal.   Nursing note and vitals reviewed.      Assessment:       1. Essential hypertension    2. Hyponatremia    3. Urinary tract infection without hematuria, site unspecified    4. Unilateral AKA        Plan:       Essential hypertension  -     cloNIDine (CATAPRES) 0.1 MG tablet; Take 1 tablet (0.1 mg total) by mouth 2 (two) times daily.  Dispense: 60 tablet; Refill: 2  -     sodium bicarbonate 650 MG tablet; Take 1 tablet (650 mg total) by mouth 4 (four) times daily.  Dispense: 120 tablet    Hyponatremia  -      cloNIDine (CATAPRES) 0.1 MG tablet; Take 1 tablet (0.1 mg total) by mouth 2 (two) times daily.  Dispense: 60 tablet; Refill: 2  -     sodium bicarbonate 650 MG tablet; Take 1 tablet (650 mg total) by mouth 4 (four) times daily.  Dispense: 120 tablet    Urinary tract infection without hematuria, site unspecified    Unilateral AKA  -     Ambulatory Referral to Physical/Occupational Therapy    Other orders  -     ampicillin (PRINCIPEN) 500 MG capsule; Take 1 capsule (500 mg total) by mouth every 6 (six) hours. for 10 days  Dispense: 40 capsule; Refill: 0    Abx  Refill meds  BW in 2 weeks  Refer to PT for PMD evaluation.        Risks, benefits, and side effects were discussed with the patient. All questions were answered to the fullest satisfaction of the patient, and pt verbalized understanding and agreement to treatment plan. Pt was to call with any new or worsening symptoms, or present to the ER.

## 2018-10-09 ENCOUNTER — TELEPHONE (OUTPATIENT)
Dept: FAMILY MEDICINE | Facility: CLINIC | Age: 63
End: 2018-10-09

## 2018-10-09 NOTE — TELEPHONE ENCOUNTER
I called and advised Zhao that he has an appt for a Power Wheelchair at  Rehab on 10/18/18 @ 11:00 am. He voiced understanding.    Wally

## 2018-10-15 ENCOUNTER — TELEPHONE (OUTPATIENT)
Dept: FAMILY MEDICINE | Facility: CLINIC | Age: 63
End: 2018-10-15

## 2018-10-15 NOTE — TELEPHONE ENCOUNTER
BRANNON for pt that we have no availability on the 18th, advised that he keep the appt on the 17th.  Justine

## 2018-10-17 ENCOUNTER — OFFICE VISIT (OUTPATIENT)
Dept: FAMILY MEDICINE | Facility: CLINIC | Age: 63
End: 2018-10-17
Payer: MEDICARE

## 2018-10-17 VITALS
DIASTOLIC BLOOD PRESSURE: 91 MMHG | RESPIRATION RATE: 20 BRPM | SYSTOLIC BLOOD PRESSURE: 142 MMHG | HEART RATE: 57 BPM | OXYGEN SATURATION: 99 %

## 2018-10-17 DIAGNOSIS — F41.1 GAD (GENERALIZED ANXIETY DISORDER): ICD-10-CM

## 2018-10-17 DIAGNOSIS — E87.1 HYPONATREMIA: ICD-10-CM

## 2018-10-17 DIAGNOSIS — I10 ESSENTIAL HYPERTENSION: ICD-10-CM

## 2018-10-17 PROCEDURE — 3077F SYST BP >= 140 MM HG: CPT | Mod: CPTII,,, | Performed by: FAMILY MEDICINE

## 2018-10-17 PROCEDURE — 99214 OFFICE O/P EST MOD 30 MIN: CPT | Mod: S$PBB,,, | Performed by: FAMILY MEDICINE

## 2018-10-17 PROCEDURE — 99213 OFFICE O/P EST LOW 20 MIN: CPT | Mod: PBBFAC,PN | Performed by: FAMILY MEDICINE

## 2018-10-17 PROCEDURE — 99999 PR PBB SHADOW E&M-EST. PATIENT-LVL III: CPT | Mod: PBBFAC,,, | Performed by: FAMILY MEDICINE

## 2018-10-17 PROCEDURE — 3080F DIAST BP >= 90 MM HG: CPT | Mod: CPTII,,, | Performed by: FAMILY MEDICINE

## 2018-10-17 RX ORDER — ALBUTEROL SULFATE 90 UG/1
2 AEROSOL, METERED RESPIRATORY (INHALATION) EVERY 6 HOURS PRN
Qty: 18 G | Refills: 0 | Status: SHIPPED | OUTPATIENT
Start: 2018-10-17 | End: 2019-04-24

## 2018-10-17 RX ORDER — SODIUM BICARBONATE 650 MG/1
650 TABLET ORAL 4 TIMES DAILY
Qty: 120 TABLET | COMMUNITY
Start: 2018-10-17 | End: 2019-03-15 | Stop reason: SDUPTHER

## 2018-10-17 RX ORDER — TRAMADOL HYDROCHLORIDE 50 MG/1
TABLET ORAL
Refills: 0 | COMMUNITY
Start: 2018-10-05 | End: 2018-10-24

## 2018-10-17 RX ORDER — DIAZEPAM 10 MG/1
10 TABLET ORAL 2 TIMES DAILY PRN
Qty: 28 TABLET | Refills: 0 | Status: SHIPPED | OUTPATIENT
Start: 2018-10-17 | End: 2019-03-12

## 2018-10-17 NOTE — PROGRESS NOTES
"Subjective:       Patient ID: Zhao Lee Jr. is a 62 y.o. male.    Chief Complaint: Follow-up    Follow up    Having anxiety and muscle spasms  Has been consuming alcohol to help deal with symptoms  Worsening  Worst in back  Radiates to legs      Review of Systems   Constitutional: Negative for activity change, appetite change, fatigue, fever and unexpected weight change (losing weight).   HENT: Negative for congestion, dental problem, ear discharge, hearing loss, postnasal drip, sinus pain, sneezing and trouble swallowing.    Eyes: Negative for photophobia and discharge.   Respiratory: Negative for apnea, cough and shortness of breath.    Cardiovascular: Negative for chest pain.   Gastrointestinal: Negative for abdominal distention, abdominal pain, blood in stool, diarrhea, nausea and vomiting.   Endocrine: Negative for cold intolerance.   Genitourinary: Negative for difficulty urinating, flank pain, frequency, hematuria and testicular pain.   Musculoskeletal: Positive for arthralgias, back pain and gait problem. Negative for myalgias.        Sore "lump" on his buttocks area; s/p old Ortho surgery to pelvis   Skin: Negative for color change.   Allergic/Immunologic: Negative for environmental allergies, food allergies and immunocompromised state.   Neurological: Negative for dizziness, syncope, numbness and headaches.   Hematological: Negative for adenopathy. Does not bruise/bleed easily.   Psychiatric/Behavioral: Negative for agitation, confusion, decreased concentration, hallucinations, self-injury and sleep disturbance.   All other systems reviewed and are negative.        Reviewed family, medical, surgical, and social history.    Objective:      BP (!) 142/91 (BP Location: Left arm, Patient Position: Sitting, BP Method: Medium (Manual))   Pulse (!) 57   Resp 20   SpO2 99%   Physical Exam   Constitutional: He is oriented to person, place, and time. He appears well-developed and well-nourished. No distress. "   HENT:   Head: Normocephalic and atraumatic.   Nose: Nose normal.   Mouth/Throat: Oropharynx is clear and moist. No oropharyngeal exudate.   Eyes: Conjunctivae and EOM are normal. Pupils are equal, round, and reactive to light.   Neck: Normal range of motion. No thyromegaly present.   Cardiovascular: Normal rate, regular rhythm, normal heart sounds and intact distal pulses. Exam reveals no gallop and no friction rub.   No murmur heard.  Pulmonary/Chest: Effort normal and breath sounds normal. No respiratory distress. He has no wheezes. He has no rales.   Abdominal: Soft. He exhibits no distension. There is no tenderness. There is no guarding.   Musculoskeletal: Normal range of motion. He exhibits tenderness and deformity. He exhibits no edema.        Legs:  Neurological: He is alert and oriented to person, place, and time. He displays normal reflexes. No cranial nerve deficit or sensory deficit. He exhibits normal muscle tone. Coordination abnormal.   Skin: Skin is warm and dry. No rash noted. He is not diaphoretic. No erythema. No pallor.   Psychiatric: He has a normal mood and affect. His behavior is normal. Judgment and thought content normal.   Nursing note and vitals reviewed.      Assessment:       1. GARETT (generalized anxiety disorder)    2. Essential hypertension    3. Hyponatremia        Plan:       GARETT (generalized anxiety disorder)  -     diazePAM (VALIUM) 10 MG Tab; Take 1 tablet (10 mg total) by mouth 2 (two) times daily as needed (anxiety).  Dispense: 28 tablet; Refill: 0    Essential hypertension  -     sodium bicarbonate 650 MG tablet; Take 1 tablet (650 mg total) by mouth 4 (four) times daily.  Dispense: 120 tablet    Hyponatremia  -     sodium bicarbonate 650 MG tablet; Take 1 tablet (650 mg total) by mouth 4 (four) times daily.  Dispense: 120 tablet    Other orders  -     albuterol (VENTOLIN HFA) 90 mcg/actuation inhaler; Inhale 2 puffs into the lungs every 6 (six) hours as needed for Wheezing.  Rescue  Dispense: 18 g; Refill: 0            Risks, benefits, and side effects were discussed with the patient. All questions were answered to the fullest satisfaction of the patient, and pt verbalized understanding and agreement to treatment plan. Pt was to call with any new or worsening symptoms, or present to the ER.

## 2018-10-23 ENCOUNTER — TELEPHONE (OUTPATIENT)
Dept: FAMILY MEDICINE | Facility: CLINIC | Age: 63
End: 2018-10-23

## 2018-10-23 NOTE — TELEPHONE ENCOUNTER
Pt states he missed his appt yesterday with .  Rescheduled appt with ANDRIA Law tomorrow.   No other concerns at this time.

## 2018-10-24 ENCOUNTER — OFFICE VISIT (OUTPATIENT)
Dept: FAMILY MEDICINE | Facility: CLINIC | Age: 63
End: 2018-10-24
Payer: MEDICARE

## 2018-10-24 VITALS
TEMPERATURE: 98 F | DIASTOLIC BLOOD PRESSURE: 84 MMHG | SYSTOLIC BLOOD PRESSURE: 178 MMHG | BODY MASS INDEX: 20.56 KG/M2 | WEIGHT: 131 LBS | HEIGHT: 67 IN | OXYGEN SATURATION: 98 % | HEART RATE: 86 BPM

## 2018-10-24 DIAGNOSIS — F41.1 GAD (GENERALIZED ANXIETY DISORDER): ICD-10-CM

## 2018-10-24 DIAGNOSIS — G47.00 INSOMNIA, UNSPECIFIED TYPE: ICD-10-CM

## 2018-10-24 DIAGNOSIS — G62.9 NEUROPATHY: ICD-10-CM

## 2018-10-24 DIAGNOSIS — S78.119A UNILATERAL AKA: ICD-10-CM

## 2018-10-24 DIAGNOSIS — I10 ESSENTIAL HYPERTENSION: ICD-10-CM

## 2018-10-24 DIAGNOSIS — G89.4 CHRONIC PAIN SYNDROME: Primary | ICD-10-CM

## 2018-10-24 PROCEDURE — 99213 OFFICE O/P EST LOW 20 MIN: CPT | Mod: PBBFAC,PN | Performed by: NURSE PRACTITIONER

## 2018-10-24 PROCEDURE — 99999 PR PBB SHADOW E&M-EST. PATIENT-LVL III: CPT | Mod: PBBFAC,,, | Performed by: NURSE PRACTITIONER

## 2018-10-24 PROCEDURE — 3008F BODY MASS INDEX DOCD: CPT | Mod: CPTII,,, | Performed by: NURSE PRACTITIONER

## 2018-10-24 PROCEDURE — 99214 OFFICE O/P EST MOD 30 MIN: CPT | Mod: S$PBB,,, | Performed by: NURSE PRACTITIONER

## 2018-10-24 PROCEDURE — 3079F DIAST BP 80-89 MM HG: CPT | Mod: CPTII,,, | Performed by: NURSE PRACTITIONER

## 2018-10-24 PROCEDURE — 3077F SYST BP >= 140 MM HG: CPT | Mod: CPTII,,, | Performed by: NURSE PRACTITIONER

## 2018-10-24 RX ORDER — GABAPENTIN 300 MG/1
300 CAPSULE ORAL 2 TIMES DAILY
Qty: 180 CAPSULE | Refills: 2 | Status: SHIPPED | OUTPATIENT
Start: 2018-10-24 | End: 2019-04-24

## 2018-10-24 RX ORDER — GABAPENTIN 600 MG/1
600 TABLET ORAL NIGHTLY
Qty: 90 TABLET | Refills: 2 | Status: SHIPPED | OUTPATIENT
Start: 2018-10-24 | End: 2019-04-24

## 2018-10-24 NOTE — PROGRESS NOTES
"Chief Complaint  Chief Complaint   Patient presents with    Follow-up       HPI:  Zhao Lee Jr. is a 62 y.o. male with medical diagnoses as listed and reviewed within the medical history and problem list that presents for complaint of chronic pain and needing gabapentin refill. Pt has been having difficulty sleeping. He reports that the Valium helps some with the muscle spasms that he was having but he is still waking up every couple of hours with back and hip pain. Pt has trouble performing ADL's and is falling occasionally. He does have a right AKA and wears a prosthesis. He is able to ambulate with assist from a walker.     PAST MEDICAL HISTORY:  Past Medical History:   Diagnosis Date    Hypertension        PAST SURGICAL HISTORY:  Past Surgical History:   Procedure Laterality Date    TOE AMPUTATION         SOCIAL HISTORY:  Social History     Socioeconomic History    Marital status:      Spouse name: Not on file    Number of children: Not on file    Years of education: Not on file    Highest education level: Not on file   Social Needs    Financial resource strain: Not on file    Food insecurity - worry: Not on file    Food insecurity - inability: Not on file    Transportation needs - medical: Not on file    Transportation needs - non-medical: Not on file   Occupational History    Not on file   Tobacco Use    Smoking status: Current Every Day Smoker     Packs/day: 0.50    Smokeless tobacco: Never Used   Substance and Sexual Activity    Alcohol use: No    Drug use: No    Sexual activity: No   Other Topics Concern    Not on file   Social History Narrative    Not on file       FAMILY HISTORY:  History reviewed. No pertinent family history.    ALLERGIES AND MEDICATIONS: updated and reviewed.  Review of patient's allergies indicates:   Allergen Reactions    Morphine      "Timed release morphine puts me in the ICU every time I take it."     Current Outpatient Medications   Medication Sig " Dispense Refill    albuterol (VENTOLIN HFA) 90 mcg/actuation inhaler Inhale 2 puffs into the lungs every 6 (six) hours as needed for Wheezing. Rescue 18 g 0    cholecalciferol, vitamin D3, (VITAMIN D3) 1,000 unit capsule Vitamin D3      cloNIDine (CATAPRES) 0.1 MG tablet Take 1 tablet (0.1 mg total) by mouth 2 (two) times daily. 60 tablet 2    diazePAM (VALIUM) 10 MG Tab Take 1 tablet (10 mg total) by mouth 2 (two) times daily as needed (anxiety). 28 tablet 0    gabapentin (NEURONTIN) 300 MG capsule Take 1 capsule (300 mg total) by mouth 2 (two) times daily. 180 capsule 2    multivit with minerals/lutein (MULTIVITAMIN 50 PLUS ORAL) multivitamin      sodium bicarbonate 650 MG tablet Take 1 tablet (650 mg total) by mouth 4 (four) times daily. 120 tablet     gabapentin (NEURONTIN) 600 MG tablet Take 1 tablet (600 mg total) by mouth every evening. 90 tablet 2    lisinopril (PRINIVIL,ZESTRIL) 20 MG tablet Take 1 tablet (20 mg total) by mouth once daily. 90 tablet 3     No current facility-administered medications for this visit.          ROS  Review of Systems   Constitutional: Positive for activity change and fatigue. Negative for appetite change, chills and fever.   HENT: Negative for congestion, postnasal drip, rhinorrhea and sore throat.    Respiratory: Negative for cough, chest tightness, shortness of breath and wheezing.    Cardiovascular: Negative for chest pain and palpitations.   Gastrointestinal: Negative for abdominal distention, abdominal pain, constipation, diarrhea, nausea and vomiting.   Genitourinary: Negative for dysuria.   Musculoskeletal: Positive for arthralgias, back pain, gait problem, myalgias and neck pain.   Skin: Positive for color change. Negative for rash.   Neurological: Positive for weakness and headaches. Negative for dizziness.   Psychiatric/Behavioral: Positive for sleep disturbance. Negative for confusion. The patient is nervous/anxious.            PHYSICAL EXAM  Vitals:     "10/24/18 1435   BP: (!) 178/84   BP Location: Right arm   Patient Position: Sitting   BP Method: Large (Automatic)   Pulse: 86   Temp: 98 °F (36.7 °C)   TempSrc: Oral   SpO2: 98%   Weight: 59.4 kg (131 lb)   Height: 5' 7" (1.702 m)    Body mass index is 20.52 kg/m².  Weight: 59.4 kg (131 lb)   Height: 5' 7" (170.2 cm)       Physical Exam   Constitutional: He is oriented to person, place, and time. He appears well-developed and well-nourished.   HENT:   Head: Normocephalic.   Eyes: Pupils are equal, round, and reactive to light.   Neck: Normal range of motion. Neck supple.   Cardiovascular: Normal rate, regular rhythm, S1 normal and S2 normal.   No murmur heard.  Pulmonary/Chest: Effort normal and breath sounds normal. He has no wheezes.   Abdominal: Soft. Normal appearance and bowel sounds are normal. He exhibits no distension. There is no tenderness.   Musculoskeletal: Normal range of motion. He exhibits tenderness and deformity.   Right AKA with prosthesis. Ambulates slowly with walker. Pt has bruising to arms and legs.    Neurological: He is alert and oriented to person, place, and time. He exhibits abnormal muscle tone. Coordination abnormal.   Skin: Skin is warm and dry. Capillary refill takes less than 2 seconds.   Psychiatric: He has a normal mood and affect. His speech is normal and behavior is normal. Thought content normal. Cognition and memory are normal.   Vitals reviewed.        Health Maintenance       Date Due Completion Date    Hepatitis C Screening 1955 ---    Lipid Panel 1955 ---    TETANUS VACCINE 11/29/1971 ---    Pneumococcal PPSV23 (Medium Risk) (1) 11/29/1971 ---    Zoster Vaccine 11/29/2013 ---    Influenza Vaccine 08/01/2018 ---    Colonoscopy 02/21/2027 2/21/2017               Assessment & Plan    Zhao was seen today for follow-up.    Diagnoses and all orders for this visit:    Chronic pain syndrome  Comments:  Uncontrolled. Pt is off most medications. Refill gabapentin. "   Orders:  -     gabapentin (NEURONTIN) 300 MG capsule; Take 1 capsule (300 mg total) by mouth 2 (two) times daily.  -     gabapentin (NEURONTIN) 600 MG tablet; Take 1 tablet (600 mg total) by mouth every evening.    GARETT (generalized anxiety disorder)  Comments:  Stable on Valium    Essential hypertension  Comments:  BP elevated upon arrival. Re-checked prior to leaving and improved. See vitals.     Unilateral AKA  Comments:  Stable. Monitor.    Neuropathy  Comments:  Chronic. Uncontrolled. Refill gabapentin  Orders:  -     gabapentin (NEURONTIN) 300 MG capsule; Take 1 capsule (300 mg total) by mouth 2 (two) times daily.  -     gabapentin (NEURONTIN) 600 MG tablet; Take 1 tablet (600 mg total) by mouth every evening.    Insomnia, unspecified type  Comments:  Instructed patient to take Gabapentin at bedtime with Valium. If needed, take melatonin OTC. Pt educated about sleep hygiene and relaxation techniques.   Orders:  -     gabapentin (NEURONTIN) 600 MG tablet; Take 1 tablet (600 mg total) by mouth every evening.        Follow-up: Follow-up in about 3 months (around 1/24/2019).      Risks, benefits, and side effects were discussed with the patient. All questions were answered to the fullest satisfaction of the patient, and pt verbalized understanding and agreement to treatment plan. Pt was to call with any new or worsening symptoms, or present to the ER.

## 2018-10-29 ENCOUNTER — TELEPHONE (OUTPATIENT)
Dept: FAMILY MEDICINE | Facility: CLINIC | Age: 63
End: 2018-10-29

## 2018-10-29 NOTE — TELEPHONE ENCOUNTER
----- Message from Dyan Chaudhry sent at 10/29/2018  4:39 PM CDT -----  Contact: Self  Patient needs to speak to the nurse about some of his medications     Please call back [phone] (home)

## 2018-10-31 ENCOUNTER — TELEPHONE (OUTPATIENT)
Dept: FAMILY MEDICINE | Facility: CLINIC | Age: 63
End: 2018-10-31

## 2018-10-31 NOTE — TELEPHONE ENCOUNTER
----- Message from Dara Wilkins sent at 10/31/2018 10:12 AM CDT -----  Type:  Sooner Apoointment Request    Caller is requesting a sooner appointment.  Caller declined first available appointment listed below.  Caller will not accept being placed on the waitlist and is requesting a message be sent to doctor.    Name of Caller:  Self   When is the first available appointment?  NA Symptoms:  NA   Best Call Back Number:  [phone]  Additional Information:  Patient asking to be seen  Friday for follow up

## 2018-10-31 NOTE — TELEPHONE ENCOUNTER
Pt states he saw his nephrologist yesterday, Dr. Navarrete, and they will be sending over lab order. Pt would like to have labs drawn at this office in [place].     Spoke with Dudley Navarrete, and they did not see pt yesterday, pt has an appt today, 10.31.18. States they will fax over any lab orders, if they are written today.     ----- Message from Nawaf Bernal sent at 10/31/2018 11:14 AM CDT -----  Type:  Patient Returning Call    Who Called:  patient  Who Left Message for Patient:  PHILLIP ALFORD  Does the patient know what this is regarding?:  appointment  Best Call Back Number:  [phone]

## 2018-11-05 ENCOUNTER — TELEPHONE (OUTPATIENT)
Dept: FAMILY MEDICINE | Facility: CLINIC | Age: 63
End: 2018-11-05

## 2018-11-05 DIAGNOSIS — G89.29 CHRONIC BILATERAL LOW BACK PAIN WITHOUT SCIATICA: Primary | ICD-10-CM

## 2018-11-05 DIAGNOSIS — M54.50 CHRONIC BILATERAL LOW BACK PAIN WITHOUT SCIATICA: Primary | ICD-10-CM

## 2018-11-05 NOTE — TELEPHONE ENCOUNTER
----- Message from Itz Cloud sent at 11/5/2018 12:25 PM CST -----  Contact: Pt  Name of Who is Calling: Zhao      What is the request in detail: Patient is calling requesting to speak with a nurse in regards to an order for physical therapy      Can the clinic reply by [facility]:       What Number to Call Back if not in [facility]: .[phone] (home)

## 2018-11-05 NOTE — TELEPHONE ENCOUNTER
I have spoken with patient.  He stated that the physical department that he was referred to here in [place] does not do the assessment necessary for a new wheelchair.  He is requesting a referral to [facility] for this assessment. He is also requesting a refill on Valium; he was reminded of the previous failed UDS.  Please advise and thank you, Justine

## 2018-11-09 ENCOUNTER — TELEPHONE (OUTPATIENT)
Dept: FAMILY MEDICINE | Facility: CLINIC | Age: 63
End: 2018-11-09

## 2018-11-09 NOTE — TELEPHONE ENCOUNTER
----- Message from Viviane Robbie sent at 11/9/2018  1:08 PM CST -----  Contact: Pt  PT called to speak to the nurse regarding his care and a referral to physical therapy and would like a call back. Pt stated that he was told that he needed to go to a different place.    Pt can be reached at [phone].    Thanks

## 2018-11-12 ENCOUNTER — TELEPHONE (OUTPATIENT)
Dept: FAMILY MEDICINE | Facility: CLINIC | Age: 63
End: 2018-11-12

## 2018-11-12 NOTE — TELEPHONE ENCOUNTER
----- Message from Keysha Gutierrez sent at 11/12/2018 11:44 AM CST -----  Please call pt at [phone] / asking to speak to nurse /  please send orders   Ph [phone] fax [phone] / physical therapy orders

## 2018-11-20 NOTE — TELEPHONE ENCOUNTER
BRANNON Bell, returning her call.  Dee Dee   Patient of Dr. Rosendo Beck seen inpatient for psychosocial assessment.   60 minutes spent with patient. 100% of visit consisted of counseling related to issues surrounding lvad implant and heart transplant.    Psychosocial Assessment   Name: Everton Larios     MRN:  [id number]        Patient Name / Age / Race: Everton Larios 53 year old    Source of Information: Patient   : Marilyn Mcginnis   Interview Date: November 20, 2018   Reason for Referral: Heart Transplant and Mechanical Circulatory Support     Current Living Situation   Location ([facility]/State): [address] [place] [id number]   With Whom: Living arrangements - the patient lives alone.   Type of Home: [facility] with no stairs. He is looking into moving into an apartment within his same complex.    Working Phone? Yes  -cell phone only   [employer] Out[employer]? Yes    Distance from Hospital: 15 miles   Need to Relocate Post Surgery? No   Discussed? No     Vocational/Employment/Financial   Employment: Part time and Disabled   Occupation: Everton works part time at [employer] as a . He had been a  before becoming disabled.    Education: Some college    [place]? No   Income: salary/wages and SSD   Insurance: Medicare and Private Insurance   Name of Private Insurance: Blue Cross/ Blue Shield supplement, this is ending 1/2019. He is working with the [facility] to make a new choice.    Medication Coverage: Blue Cross/ Blue Shield     In current situation, pt. can afford medication and supply costs:  Yes    Other Financial Concerns/Issues: Everton reports that he is on the brink of needing to file for bankruptcy. He reports that he had been on extra help in the past, but was over income with his part time job last year.      Family/Social Support   Marital Status: single (never )   Partner Name: n/a   Length of Time : n/a   Partner s Employment: n/a   Relationship: n/a   Children: none   Parents: Mom  and Dad are supportive and live local. Dad is diabetic and on dialysis. They are both in their 70s.    Siblings: 2 brothers; 1 lives in the [facility] & 1 lives (with his wife) in [place]   Other Family or Friends Close by: A few friends    Support System: available, occasional   Primary Support Person: Mom    Issues: Mom is caring for Everton's dad who has many medical issues. He would go to his parent's home [place] At his [place] home he will need to do 1 flight of stairs to the bedroom.      Activities/Functional Ability   Current Level: ambulatory and independent with ADL's   Daily Activities:    Frailty Score: 4 Everton reports that he's been slowing down. He's been napping daily. He can still do his own cooking & cleaning.    Transportation: self      Medical Status   Patient s understanding of need for surgical intervention: Everton appears to understand his medical condition and treatment options.    Advanced Directive? No    Details: DOMENIC gave blank copy and discussed several times. He plans to bring it home and complete it. He will call DOMENIC if he has other questions.    Adherence History: Everton reports that he's independent with his medications and clinic appointments without any trouble.    Ability to Adhere to Complex Medical Regime: Yes.      Behavioral   Chemical Dependency Issues: No   Everton reports that he hasn't had anything to drink for 1 month with Dr. Montero told him he needed to stop. Prior to that he'd go to the bar 2-3 times a week and have 2-3 drinks a time. He reports that he hasn't use marijuana since about 1990. He reports that he has had 2 DUIs (1990 & 2002). He reports that he completed court ordered treatment in 2002 after his 2nd DUI.    Smoker? No quit in 2011 or 2012   Psychiatric impairment: No   Everton reports that he did have one suicide attempt in 2002. He saw a therapist and was started on medication. He reports that it was situational and he  hasn't had any problems since that time. He reports no current depression or suicidal thoughts.    Coping Style/Strategies: Everton reports that talking is most helpful to him if he's having a bad day. He will call a friend to chat. He does enjoy reading, but hasn't done much lately.    Recent Legal History: No   Teaching Completed During Assessment Related To Transplant and Mechanical Circulatory Support: 1. Housing and relocation needs post surgery.  2. Caregiver needs post surgery.  3. Financial issues related to surgery.  4. Risks of alcohol use post surgery.  5. Common psychosocial stressors pre/post surgery.  6. Support group availability.   Psychosocial Risks Reviewed Related To Transplant and Mechanical Circulatory Support: 1. Increased stress related to your emotional, family, social, employment, or financial situation.  2. Affect on work and/or disability benefits.  3. Affect on future health and life insurance.  4. Outcome expectations may not be met.  5. Mental Health risks: anxiety, depression, PTSD, guilt, grief and chronic fatigue.     Observed Behavior   Well informed? Yes  Angry? No   Process info well? Yes  Hostile? No   Evasive? No Oriented X3? Yes    Cautious/Suspicious? No Motivated? Yes    Appropriate Behavior? Yes  Depressed? No   Appropriate Affect? Yes  Anxious? No   Interview Observations: Everton was talkative and asked good questions.      Selection Criteria Met   Plan for Support Yes    Chemical Dependence Yes    Smoking Yes    Mental Health Yes    Adequate Finances Yes      Risk Issues:    1. Everton at first wasn't sure who his caregiver would be; he later reported that his mom would be available to care for him. She was not present at the time of our conversation. It would be beneficial for her to talk with someone from the team. She is also caring for Everton's dad who has diabetes and is on dialysis.     2. Everton reports that finances are very difficult for him. He can currently afford what he  needs, but is concerned if he can't return to work.     Final Evaluation/Assessment:     Met with Everton on multiple occasions to get evaluation complete due to busyness of his ICU stay. He at first reported that he didn't have anyone to be his caregiver; but after a couple of days and talking with family he reports that his mom could do it. She is currently caring for Everton's dad and so Everton would need to discharge to their home. She was not present and it would be beneficial if she heard from the medical team the caregiver role & responsibility. Everton reports current adequate insurance; but needs to chose a new supplemental policy for January 2019. He reports that finances are very tight right now. He is concerned about his expenses if he isn't able to return to work. He is looking into moving into a smaller space to help save money. No current issues with mental health, tobacco use, illicit drug use, or legal issues. Everton reports a history of heavy drinking, but in the last few years averaging about 2-3 drinks, 2-3 times a week. He reports that he quit when he was told he needed to and he doesn't feel he needs any assistance to remain abstinent at this time. If he's unable to remain abstinent then a CD referral for assessment and possible treatment would be beneficial.    At this time Everton appears to be acceptable for lvad/ heart transplant from psychosocial perspective.     Patient understands risks and benefits of Heart Transplant and Mechanical Circulatory Support: Yes     Recommendation:    acceptable    Signature: Marilyn Mcginnis     Title: Licensed Independent Clinical                Interview Date: November 20, 2018

## 2018-11-26 ENCOUNTER — TELEPHONE (OUTPATIENT)
Dept: FAMILY MEDICINE | Facility: CLINIC | Age: 63
End: 2018-11-26

## 2018-11-26 NOTE — TELEPHONE ENCOUNTER
----- Message from Rachelle Barkley LPN sent at 11/26/2018  8:50 AM CST -----  Contact: ANDRIA Ponce (w/ matrix) [phone]   [facility]  ----- Message -----  From: Donna Marroquin  Sent: 11/24/2018  11:22 AM  To: Arthur Jessica Staff    ANDRIA Ponce (w/ matrix) [phone]   She did a screening PAD, left side moderate, decrease circulation  Pulse was normal   Have to report abnormal finding

## 2018-12-17 ENCOUNTER — TELEPHONE (OUTPATIENT)
Dept: FAMILY MEDICINE | Facility: CLINIC | Age: 63
End: 2018-12-17

## 2018-12-17 NOTE — TELEPHONE ENCOUNTER
----- Message from Magdi Jauregui sent at 12/17/2018  8:23 AM CST -----  Contact: self   Type:  Same Day Appointment Request    Caller is requesting a same day appointment.  Caller declined first available appointment listed below.      Name of Caller: patient   When is the first available appointment? January   Symptoms: hospital f/u breathing issues   Best Call Back Number: [phone] (home)       Additional Information: patient is requesting to be seen today or tomorrow morning

## 2018-12-17 NOTE — TELEPHONE ENCOUNTER
I have spoken with patient.  He stated that he went to the [facility] ER on Thursday and was diagnosed with COPD and bronchitis, requesting f/u appt. Pt stated that he was given doxycycline, Prednisone, and Albuterol. Appt made with Viky for 12/19.  Pt voiced understanding. Justine

## 2018-12-19 ENCOUNTER — OFFICE VISIT (OUTPATIENT)
Dept: FAMILY MEDICINE | Facility: CLINIC | Age: 63
End: 2018-12-19
Payer: MEDICARE

## 2018-12-19 VITALS
HEART RATE: 90 BPM | SYSTOLIC BLOOD PRESSURE: 166 MMHG | RESPIRATION RATE: 22 BRPM | DIASTOLIC BLOOD PRESSURE: 88 MMHG | HEIGHT: 67 IN | BODY MASS INDEX: 20.56 KG/M2 | WEIGHT: 131 LBS | OXYGEN SATURATION: 90 %

## 2018-12-19 DIAGNOSIS — J44.1 COPD EXACERBATION: Primary | ICD-10-CM

## 2018-12-19 PROCEDURE — 99999 PR PBB SHADOW E&M-EST. PATIENT-LVL III: CPT | Mod: PBBFAC,,, | Performed by: NURSE PRACTITIONER

## 2018-12-19 PROCEDURE — 3077F SYST BP >= 140 MM HG: CPT | Mod: CPTII,S$GLB,, | Performed by: NURSE PRACTITIONER

## 2018-12-19 PROCEDURE — 3288F FALL RISK ASSESSMENT DOCD: CPT | Mod: CPTII,S$GLB,, | Performed by: NURSE PRACTITIONER

## 2018-12-19 PROCEDURE — 96372 THER/PROPH/DIAG INJ SC/IM: CPT | Mod: S$GLB,,, | Performed by: NURSE PRACTITIONER

## 2018-12-19 PROCEDURE — 1100F PTFALLS ASSESS-DOCD GE2>/YR: CPT | Mod: CPTII,S$GLB,, | Performed by: NURSE PRACTITIONER

## 2018-12-19 PROCEDURE — 3079F DIAST BP 80-89 MM HG: CPT | Mod: CPTII,S$GLB,, | Performed by: NURSE PRACTITIONER

## 2018-12-19 PROCEDURE — 99214 OFFICE O/P EST MOD 30 MIN: CPT | Mod: 25,S$GLB,, | Performed by: NURSE PRACTITIONER

## 2018-12-19 PROCEDURE — 3008F BODY MASS INDEX DOCD: CPT | Mod: CPTII,S$GLB,, | Performed by: NURSE PRACTITIONER

## 2018-12-19 RX ORDER — DEXAMETHASONE SODIUM PHOSPHATE 4 MG/ML
8 INJECTION, SOLUTION INTRA-ARTICULAR; INTRALESIONAL; INTRAMUSCULAR; INTRAVENOUS; SOFT TISSUE
Status: COMPLETED | OUTPATIENT
Start: 2018-12-19 | End: 2018-12-19

## 2018-12-19 RX ORDER — FLUTICASONE FUROATE AND VILANTEROL 200; 25 UG/1; UG/1
1 POWDER RESPIRATORY (INHALATION) DAILY
Qty: 1 EACH | Refills: 3 | Status: SHIPPED | OUTPATIENT
Start: 2018-12-19 | End: 2019-04-24

## 2018-12-19 RX ADMIN — DEXAMETHASONE SODIUM PHOSPHATE 8 MG: 4 INJECTION, SOLUTION INTRA-ARTICULAR; INTRALESIONAL; INTRAMUSCULAR; INTRAVENOUS; SOFT TISSUE at 09:12

## 2018-12-19 NOTE — PROGRESS NOTES
Chief Complaint  Chief Complaint   Patient presents with    Follow-up    Shortness of Breath       HPI:  Zhao Lee Jr. is a 63 y.o. male with medical diagnoses as listed and reviewed within the medical history and problem list that presents for an ER follow up for COPD and bronchitis. Pt reports that he is taking albuterol nebulizer treatments, doxycycline, and prednisone that were given to him in the ER. He is still SOB with wheezing. He is out of his inhalers and is not taking any kind of maintenance medication for COPD. Pt has an overwhelming smell of cigarette smoke. I educated him on COPD and how it progresses and he verbalized understanding but is not going to stop smoking. Pt understands the disease. Pt is not congested. No fever or chills. Cough but no sputum.     PAST MEDICAL HISTORY:  Past Medical History:   Diagnosis Date    Hypertension        PAST SURGICAL HISTORY:  Past Surgical History:   Procedure Laterality Date    TOE AMPUTATION         SOCIAL HISTORY:  Social History     Socioeconomic History    Marital status:      Spouse name: Not on file    Number of children: Not on file    Years of education: Not on file    Highest education level: Not on file   Social Needs    Financial resource strain: Not on file    Food insecurity - worry: Not on file    Food insecurity - inability: Not on file    Transportation needs - medical: Not on file    Transportation needs - non-medical: Not on file   Occupational History    Not on file   Tobacco Use    Smoking status: Current Every Day Smoker     Packs/day: 0.50    Smokeless tobacco: Never Used   Substance and Sexual Activity    Alcohol use: No    Drug use: No    Sexual activity: No   Other Topics Concern    Not on file   Social History Narrative    Not on file       FAMILY HISTORY:  History reviewed. No pertinent family history.    ALLERGIES AND MEDICATIONS: updated and reviewed.  Review of patient's allergies indicates:  "  Allergen Reactions    Morphine      "Timed release morphine puts me in the ICU every time I take it."     Current Outpatient Medications   Medication Sig Dispense Refill    albuterol (VENTOLIN HFA) 90 mcg/actuation inhaler Inhale 2 puffs into the lungs every 6 (six) hours as needed for Wheezing. Rescue 18 g 0    cholecalciferol, vitamin D3, (VITAMIN D3) 1,000 unit capsule Vitamin D3      cloNIDine (CATAPRES) 0.1 MG tablet Take 1 tablet (0.1 mg total) by mouth 2 (two) times daily. 60 tablet 2    diazePAM (VALIUM) 10 MG Tab Take 1 tablet (10 mg total) by mouth 2 (two) times daily as needed (anxiety). 28 tablet 0    fluticasone-vilanterol (BREO) 200-25 mcg/dose DsDv diskus inhaler Inhale 1 puff into the lungs once daily. Controller 1 each 3    gabapentin (NEURONTIN) 300 MG capsule Take 1 capsule (300 mg total) by mouth 2 (two) times daily. 180 capsule 2    gabapentin (NEURONTIN) 600 MG tablet Take 1 tablet (600 mg total) by mouth every evening. 90 tablet 2    ipratropium-albuterol (COMBIVENT)  mcg/actuation inhaler Inhale 1 puff into the lungs every 4 (four) hours as needed for Wheezing. Rescue 1 Package 3    lisinopril (PRINIVIL,ZESTRIL) 20 MG tablet Take 1 tablet (20 mg total) by mouth once daily. 90 tablet 3    multivit with minerals/lutein (MULTIVITAMIN 50 PLUS ORAL) multivitamin      sodium bicarbonate 650 MG tablet Take 1 tablet (650 mg total) by mouth 4 (four) times daily. 120 tablet      No current facility-administered medications for this visit.          ROS  Review of Systems   Constitutional: Positive for fatigue. Negative for appetite change, chills and fever.   HENT: Negative for congestion, postnasal drip, rhinorrhea and sore throat.    Respiratory: Positive for cough, chest tightness, shortness of breath and wheezing.    Cardiovascular: Negative for chest pain and palpitations.   Genitourinary: Negative for dysuria.   Musculoskeletal: Positive for arthralgias, back pain, gait " "problem, myalgias and neck pain.   Skin: Negative for color change and rash.   Neurological: Positive for weakness and headaches. Negative for dizziness.   Psychiatric/Behavioral: Positive for sleep disturbance. Negative for confusion. The patient is nervous/anxious.            PHYSICAL EXAM  Vitals:    12/19/18 0930   BP: (!) 166/88   BP Location: Left arm   Patient Position: Sitting   Pulse: 90   Resp: (!) 22   SpO2: (!) 90%   Weight: 59.4 kg (131 lb)   Height: 5' 7" (1.702 m)    Body mass index is 20.52 kg/m².  Weight: 59.4 kg (131 lb)   Height: 5' 7" (170.2 cm)       Physical Exam   Constitutional: He is oriented to person, place, and time. He appears well-developed and well-nourished. He has a sickly appearance.   HENT:   Head: Normocephalic.   Right Ear: External ear normal.   Left Ear: External ear normal.   Nose: Nose normal.   Mouth/Throat: Oropharynx is clear and moist.   Eyes: Pupils are equal, round, and reactive to light.   Neck: Normal range of motion. Neck supple.   Cardiovascular: Normal rate, regular rhythm, S1 normal and S2 normal.   No murmur heard.  Pulmonary/Chest: Accessory muscle usage present. Tachypnea noted. He has wheezes in the right upper field, the right middle field, the right lower field, the left upper field, the left middle field and the left lower field.   Abdominal: Soft. Normal appearance and bowel sounds are normal. He exhibits no distension. There is no tenderness.   Musculoskeletal:   Pt debilitated. In wheelchair   Neurological: He is alert and oriented to person, place, and time.   Skin: Skin is warm and dry. Capillary refill takes less than 2 seconds.   Psychiatric: His speech is normal and behavior is normal. Thought content normal. His mood appears anxious. Cognition and memory are normal.   Vitals reviewed.        Health Maintenance       Date Due Completion Date    Hepatitis C Screening 1955 ---    Lipid Panel 1955 ---    TETANUS VACCINE 11/29/1971 ---    " Zoster Vaccine 11/29/2013 ---    Influenza Vaccine 08/01/2018 ---    Pneumococcal Vaccine (65+ Low/Medium Risk) (1 of 2 - PCV13) 11/29/2018 ---    Abdominal Aortic Aneurysm Screening 11/29/2018 ---    Colonoscopy 02/21/2027 2/21/2017               Assessment & Plan    Zhao was seen today for follow-up and shortness of breath.    Diagnoses and all orders for this visit:    COPD exacerbation  -     ipratropium-albuterol (COMBIVENT)  mcg/actuation inhaler; Inhale 1 puff into the lungs every 4 (four) hours as needed for Wheezing. Rescue  -     dexamethasone injection 8 mg  -     fluticasone-vilanterol (BREO) 200-25 mcg/dose DsDv diskus inhaler; Inhale 1 puff into the lungs once daily. Controller    - Continue current medications from hospital. Add new Rx.     Follow-up: Follow-up if symptoms worsen or fail to improve.      Risks, benefits, and side effects were discussed with the patient. All questions were answered to the fullest satisfaction of the patient, and pt verbalized understanding and agreement to treatment plan. Pt was to call with any new or worsening symptoms, or present to the ER.     Yes

## 2019-01-02 ENCOUNTER — TELEPHONE (OUTPATIENT)
Dept: FAMILY MEDICINE | Facility: CLINIC | Age: 64
End: 2019-01-02

## 2019-01-02 NOTE — TELEPHONE ENCOUNTER
"I returned patient's call and encouraged pt to report to ED for the reported symptoms.  Pt sounded in no acute distress. Pt stated he cannot make it this morning because his brother's car is broken and wants an appt with only Dr. Gibson.  He was told that Dr. Gibson has no appointments until February and offered pt appt with Viky Powell.  Pt then stated, "I just changed doctors and I am tired of this," and then hung the phone up.  Justine"

## 2019-01-02 NOTE — TELEPHONE ENCOUNTER
----- Message from Apolonia Dawkins sent at 1/2/2019  8:16 AM CST -----  Contact: Patient  Type:  Sooner Apoointment Request    Caller is requesting a sooner appointment.  Caller declined first available appointment listed below.  Caller will not accept being placed on the waitlist and is requesting a message be sent to doctor.    Name of Caller:  Patient  When is the first available appointment?  1/7/19  Symptoms: difficulty breathing and weakness  Best Call Back Number:  [phone]  Additional Information:  Patient had an appointment scheduled for this morning at 9:00, but he cannot get there due to transportation problems.  He would like to be seen as soon as possible, as he is having difficulty breathing and is weak    Please call to advise  Thank you

## 2019-01-08 ENCOUNTER — OFFICE VISIT (OUTPATIENT)
Dept: FAMILY MEDICINE | Facility: CLINIC | Age: 64
End: 2019-01-08
Payer: MEDICARE

## 2019-01-08 VITALS
HEART RATE: 80 BPM | DIASTOLIC BLOOD PRESSURE: 80 MMHG | TEMPERATURE: 98 F | OXYGEN SATURATION: 96 % | SYSTOLIC BLOOD PRESSURE: 160 MMHG | BODY MASS INDEX: 19.62 KG/M2 | HEIGHT: 67 IN | WEIGHT: 125 LBS

## 2019-01-08 DIAGNOSIS — J44.9 CHRONIC OBSTRUCTIVE PULMONARY DISEASE, UNSPECIFIED COPD TYPE: ICD-10-CM

## 2019-01-08 DIAGNOSIS — F41.1 GAD (GENERALIZED ANXIETY DISORDER): ICD-10-CM

## 2019-01-08 DIAGNOSIS — F17.200 TOBACCO DEPENDENCE: ICD-10-CM

## 2019-01-08 DIAGNOSIS — I10 ESSENTIAL HYPERTENSION: Primary | ICD-10-CM

## 2019-01-08 PROCEDURE — 99999 PR PBB SHADOW E&M-EST. PATIENT-LVL IV: ICD-10-PCS | Mod: PBBFAC,,, | Performed by: FAMILY MEDICINE

## 2019-01-08 PROCEDURE — 3288F PR FALLS RISK ASSESSMENT DOCUMENTED: ICD-10-PCS | Mod: CPTII,S$GLB,, | Performed by: FAMILY MEDICINE

## 2019-01-08 PROCEDURE — 3079F PR MOST RECENT DIASTOLIC BLOOD PRESSURE 80-89 MM HG: ICD-10-PCS | Mod: CPTII,S$GLB,, | Performed by: FAMILY MEDICINE

## 2019-01-08 PROCEDURE — 99406 BEHAV CHNG SMOKING 3-10 MIN: CPT | Mod: S$GLB,,, | Performed by: FAMILY MEDICINE

## 2019-01-08 PROCEDURE — 99406 PR TOBACCO USE CESSATION INTERMEDIATE 3-10 MINUTES: ICD-10-PCS | Mod: S$GLB,,, | Performed by: FAMILY MEDICINE

## 2019-01-08 PROCEDURE — 99214 PR OFFICE/OUTPT VISIT, EST, LEVL IV, 30-39 MIN: ICD-10-PCS | Mod: 25,S$GLB,, | Performed by: FAMILY MEDICINE

## 2019-01-08 PROCEDURE — 3008F PR BODY MASS INDEX (BMI) DOCUMENTED: ICD-10-PCS | Mod: CPTII,S$GLB,, | Performed by: FAMILY MEDICINE

## 2019-01-08 PROCEDURE — 99999 PR PBB SHADOW E&M-EST. PATIENT-LVL IV: CPT | Mod: PBBFAC,,, | Performed by: FAMILY MEDICINE

## 2019-01-08 PROCEDURE — 1100F PTFALLS ASSESS-DOCD GE2>/YR: CPT | Mod: CPTII,S$GLB,, | Performed by: FAMILY MEDICINE

## 2019-01-08 PROCEDURE — 3008F BODY MASS INDEX DOCD: CPT | Mod: CPTII,S$GLB,, | Performed by: FAMILY MEDICINE

## 2019-01-08 PROCEDURE — 3079F DIAST BP 80-89 MM HG: CPT | Mod: CPTII,S$GLB,, | Performed by: FAMILY MEDICINE

## 2019-01-08 PROCEDURE — 3077F PR MOST RECENT SYSTOLIC BLOOD PRESSURE >= 140 MM HG: ICD-10-PCS | Mod: CPTII,S$GLB,, | Performed by: FAMILY MEDICINE

## 2019-01-08 PROCEDURE — 99214 OFFICE O/P EST MOD 30 MIN: CPT | Mod: 25,S$GLB,, | Performed by: FAMILY MEDICINE

## 2019-01-08 PROCEDURE — 3288F FALL RISK ASSESSMENT DOCD: CPT | Mod: CPTII,S$GLB,, | Performed by: FAMILY MEDICINE

## 2019-01-08 PROCEDURE — 1100F PR PT FALLS ASSESS DOC 2+ FALLS/FALL W/INJURY/YR: ICD-10-PCS | Mod: CPTII,S$GLB,, | Performed by: FAMILY MEDICINE

## 2019-01-08 PROCEDURE — 3077F SYST BP >= 140 MM HG: CPT | Mod: CPTII,S$GLB,, | Performed by: FAMILY MEDICINE

## 2019-01-08 RX ORDER — VARENICLINE TARTRATE 0.5 (11)-1
KIT ORAL
Qty: 1 PACKAGE | Refills: 0 | Status: SHIPPED | OUTPATIENT
Start: 2019-01-08 | End: 2019-04-24

## 2019-01-08 RX ORDER — CALCIPOTRIENE 50 UG/G
CREAM TOPICAL
Refills: 3 | COMMUNITY
Start: 2018-12-31 | End: 2019-04-24

## 2019-01-08 RX ORDER — CLOBETASOL PROPIONATE 0.5 MG/G
OINTMENT TOPICAL
Refills: 3 | COMMUNITY
Start: 2018-12-31 | End: 2019-04-24

## 2019-01-08 RX ORDER — DIAZEPAM 10 MG/1
10 TABLET ORAL 2 TIMES DAILY PRN
Qty: 60 TABLET | Refills: 1 | Status: SHIPPED | OUTPATIENT
Start: 2019-01-08 | End: 2019-02-07

## 2019-01-08 RX ORDER — ESCITALOPRAM OXALATE 10 MG/1
10 TABLET ORAL DAILY
Qty: 30 TABLET | Refills: 11 | Status: SHIPPED | OUTPATIENT
Start: 2019-01-08 | End: 2019-01-10 | Stop reason: SDUPTHER

## 2019-01-08 RX ORDER — VARENICLINE TARTRATE 1 MG/1
1 TABLET, FILM COATED ORAL 2 TIMES DAILY
Qty: 60 TABLET | Refills: 2 | Status: SHIPPED | OUTPATIENT
Start: 2019-01-08 | End: 2019-04-24

## 2019-01-08 NOTE — PROGRESS NOTES
"Subjective:       Patient ID: Zhao Lee Jr. is a 63 y.o. male.    Chief Complaint: Follow-up    In regards to the patient's hypertension, patient denies chest pain/sob, and has been compliant with the medication regimen.     Also having some anxiety/depression  Without si/hi  5 months  Since death of brother      Review of Systems   Constitutional: Positive for fatigue. Negative for appetite change, chills and fever.   HENT: Negative for congestion, postnasal drip, rhinorrhea and sore throat.    Respiratory: Positive for cough, chest tightness, shortness of breath and wheezing.    Cardiovascular: Negative for chest pain and palpitations.   Genitourinary: Negative for dysuria.   Musculoskeletal: Positive for arthralgias, back pain, gait problem, myalgias and neck pain.   Skin: Negative for color change and rash.   Neurological: Positive for weakness and headaches. Negative for dizziness.   Psychiatric/Behavioral: Positive for sleep disturbance. Negative for confusion. The patient is nervous/anxious.          Reviewed family, medical, surgical, and social history.    Objective:      BP (!) 160/80 (BP Location: Left arm, Patient Position: Sitting, BP Method: Medium (Manual))   Pulse 80   Temp 97.9 °F (36.6 °C) (Tympanic)   Ht 5' 7" (1.702 m)   Wt 56.7 kg (125 lb)   SpO2 96%   BMI 19.58 kg/m²   Physical Exam   Constitutional: He is oriented to person, place, and time. He appears well-developed and well-nourished. He has a sickly appearance.   HENT:   Head: Normocephalic.   Right Ear: External ear normal.   Left Ear: External ear normal.   Nose: Nose normal.   Mouth/Throat: Oropharynx is clear and moist.   Eyes: Pupils are equal, round, and reactive to light.   Neck: Normal range of motion. Neck supple.   Cardiovascular: Normal rate, regular rhythm, S1 normal and S2 normal.   No murmur heard.  Pulmonary/Chest: Accessory muscle usage present. Tachypnea noted. He has wheezes in the right upper field, the right " middle field, the right lower field, the left upper field, the left middle field and the left lower field.   Abdominal: Soft. Normal appearance and bowel sounds are normal. He exhibits no distension. There is no tenderness.   Musculoskeletal:   Pt debilitated. In wheelchair   Neurological: He is alert and oriented to person, place, and time.   Skin: Skin is warm and dry. Capillary refill takes less than 2 seconds.   Psychiatric: His speech is normal and behavior is normal. Thought content normal. His mood appears anxious. Cognition and memory are normal.   Vitals reviewed.      Assessment:       1. Essential hypertension    2. Tobacco dependence    3. GARETT (generalized anxiety disorder)    4. Chronic obstructive pulmonary disease, unspecified COPD type        Plan:       Essential hypertension    Tobacco dependence  -     varenicline (CHANTIX CONTINUING MONTH BOX) 1 mg Tab; Take 1 tablet (1 mg total) by mouth 2 (two) times daily.  Dispense: 60 tablet; Refill: 2  -     varenicline (CHANTIX STARTING MONTH BOX) 0.5 mg (11)- 1 mg (42) tablet; Take one 0.5mg tab by mouth once daily X3 days,then increase to one 0.5mg tab twice daily X4 days,then increase to one 1mg tab twice daily  Dispense: 1 Package; Refill: 0    GARETT (generalized anxiety disorder)  -     escitalopram oxalate (LEXAPRO) 10 MG tablet; Take 1 tablet (10 mg total) by mouth once daily.  Dispense: 30 tablet; Refill: 11  -     diazePAM (VALIUM) 10 MG Tab; Take 1 tablet (10 mg total) by mouth 2 (two) times daily as needed (anxiety/sleep).  Dispense: 60 tablet; Refill: 1    Chronic obstructive pulmonary disease, unspecified COPD type    Encouraged patient to take meds  Start meds as shown   reviewed  5 minutes was spent in tobacco counseling        Risks, benefits, and side effects were discussed with the patient. All questions were answered to the fullest satisfaction of the patient, and pt verbalized understanding and agreement to treatment plan. Pt was to  call with any new or worsening symptoms, or present to the ER.

## 2019-01-10 DIAGNOSIS — F41.1 GAD (GENERALIZED ANXIETY DISORDER): ICD-10-CM

## 2019-01-10 RX ORDER — ESCITALOPRAM OXALATE 10 MG/1
10 TABLET ORAL DAILY
Qty: 90 TABLET | Refills: 3 | Status: SHIPPED | OUTPATIENT
Start: 2019-01-10 | End: 2019-04-24

## 2019-02-05 ENCOUNTER — TELEPHONE (OUTPATIENT)
Dept: FAMILY MEDICINE | Facility: CLINIC | Age: 64
End: 2019-02-05

## 2019-02-05 NOTE — TELEPHONE ENCOUNTER
Called pt x2 to discuss, no answer. Unable to LVM bc the mailbox is full.     ----- Message from Nery Carr sent at 2/5/2019 12:10 PM CST -----  Contact: patient  Type:  Sooner Apoointment Request    Caller is requesting a sooner appointment.  Caller declined first available appointment listed below.  Caller will not accept being placed on the waitlist and is requesting a message be sent to doctor.    Name of Caller:  patient  When is the first available appointment?  03/20/2019  Symptoms:  Swollen testicle and very painful  Best Call Back Number:  [phone]  Additional Information:

## 2019-02-05 NOTE — TELEPHONE ENCOUNTER
----- Message from Ines Simon sent at 2/5/2019  3:45 PM CST -----  Contact: Caregiver Noreen  Type:  Patient Returning Call    Who Called: Noreen  Who Left Message for Patient: Rachelle  Does the patient know what this is regarding?:  Chandrakant burns  Best Call Back Number: [phone] (home)   Additional Information:  Noreen will be there until a little after five and she will answer

## 2019-02-05 NOTE — TELEPHONE ENCOUNTER
I have spoken with pt and advised that Dr. Gibson has no available appointments and that I can make him an appt with a female NP.  Pt wants to wait until his appt with Dr. Gibson on 2/19.  Pt advised to report to ER should he deem necessary.  Justine

## 2019-02-19 ENCOUNTER — OFFICE VISIT (OUTPATIENT)
Dept: FAMILY MEDICINE | Facility: CLINIC | Age: 64
End: 2019-02-19
Payer: MEDICARE

## 2019-02-19 VITALS
SYSTOLIC BLOOD PRESSURE: 165 MMHG | RESPIRATION RATE: 20 BRPM | HEART RATE: 67 BPM | DIASTOLIC BLOOD PRESSURE: 88 MMHG | OXYGEN SATURATION: 99 %

## 2019-02-19 DIAGNOSIS — M47.816 SPONDYLOSIS OF LUMBAR REGION WITHOUT MYELOPATHY OR RADICULOPATHY: ICD-10-CM

## 2019-02-19 DIAGNOSIS — I10 ESSENTIAL HYPERTENSION: Primary | ICD-10-CM

## 2019-02-19 PROCEDURE — 99999 PR PBB SHADOW E&M-EST. PATIENT-LVL III: CPT | Mod: PBBFAC,,, | Performed by: FAMILY MEDICINE

## 2019-02-19 PROCEDURE — 1100F PTFALLS ASSESS-DOCD GE2>/YR: CPT | Mod: CPTII,S$GLB,, | Performed by: FAMILY MEDICINE

## 2019-02-19 PROCEDURE — 1100F PR PT FALLS ASSESS DOC 2+ FALLS/FALL W/INJURY/YR: ICD-10-PCS | Mod: CPTII,S$GLB,, | Performed by: FAMILY MEDICINE

## 2019-02-19 PROCEDURE — 3288F FALL RISK ASSESSMENT DOCD: CPT | Mod: CPTII,S$GLB,, | Performed by: FAMILY MEDICINE

## 2019-02-19 PROCEDURE — 99214 PR OFFICE/OUTPT VISIT, EST, LEVL IV, 30-39 MIN: ICD-10-PCS | Mod: S$GLB,,, | Performed by: FAMILY MEDICINE

## 2019-02-19 PROCEDURE — 3288F PR FALLS RISK ASSESSMENT DOCUMENTED: ICD-10-PCS | Mod: CPTII,S$GLB,, | Performed by: FAMILY MEDICINE

## 2019-02-19 PROCEDURE — 3077F PR MOST RECENT SYSTOLIC BLOOD PRESSURE >= 140 MM HG: ICD-10-PCS | Mod: CPTII,S$GLB,, | Performed by: FAMILY MEDICINE

## 2019-02-19 PROCEDURE — 99999 PR PBB SHADOW E&M-EST. PATIENT-LVL III: ICD-10-PCS | Mod: PBBFAC,,, | Performed by: FAMILY MEDICINE

## 2019-02-19 PROCEDURE — 3079F DIAST BP 80-89 MM HG: CPT | Mod: CPTII,S$GLB,, | Performed by: FAMILY MEDICINE

## 2019-02-19 PROCEDURE — 3079F PR MOST RECENT DIASTOLIC BLOOD PRESSURE 80-89 MM HG: ICD-10-PCS | Mod: CPTII,S$GLB,, | Performed by: FAMILY MEDICINE

## 2019-02-19 PROCEDURE — 99214 OFFICE O/P EST MOD 30 MIN: CPT | Mod: S$GLB,,, | Performed by: FAMILY MEDICINE

## 2019-02-19 PROCEDURE — 3077F SYST BP >= 140 MM HG: CPT | Mod: CPTII,S$GLB,, | Performed by: FAMILY MEDICINE

## 2019-02-19 RX ORDER — DIAZEPAM 10 MG/1
TABLET ORAL
Refills: 1 | COMMUNITY
Start: 2019-02-07 | End: 2019-03-12

## 2019-02-19 NOTE — PROGRESS NOTES
Subjective:       Patient ID: Zhao Lee Jr. is a 63 y.o. male.    Chief Complaint: Follow-up    Follow up    In regards to the patient's hypertension, patient denies chest pain/sob, and has not been compliant with the medication regimen due to the following: forgets to take medications.    GARETT well controlled  No issues  Compliant with meds    Also with difficulty ambulating about house due to pain  Wheelchair bound  Cannot ambulate to toilet without assistance         Review of Systems   Constitutional: Positive for fatigue. Negative for appetite change, chills and fever.   HENT: Negative for congestion, postnasal drip, rhinorrhea and sore throat.    Respiratory: Positive for cough and shortness of breath. Negative for chest tightness and wheezing.    Cardiovascular: Negative for chest pain and palpitations.   Genitourinary: Negative for dysuria.   Musculoskeletal: Positive for arthralgias, back pain, gait problem, myalgias and neck pain.   Skin: Negative for color change and rash.   Neurological: Positive for weakness and headaches. Negative for dizziness.   Psychiatric/Behavioral: Positive for sleep disturbance. Negative for confusion. The patient is nervous/anxious.          Reviewed family, medical, surgical, and social history.    Objective:      BP (!) 165/88 (BP Location: Right arm, Patient Position: Sitting, BP Method: Medium (Automatic))   Pulse 67   Resp 20   SpO2 99%   Physical Exam   Constitutional: He is oriented to person, place, and time. He appears well-developed and well-nourished. He has a sickly appearance.   HENT:   Head: Normocephalic.   Right Ear: External ear normal.   Left Ear: External ear normal.   Nose: Nose normal.   Mouth/Throat: Oropharynx is clear and moist.   Eyes: Pupils are equal, round, and reactive to light.   Neck: Normal range of motion. Neck supple.   Cardiovascular: Normal rate, regular rhythm, S1 normal and S2 normal.   No murmur heard.  Pulmonary/Chest: Accessory  muscle usage present. Tachypnea noted. He has no wheezes.   Abdominal: Soft. Normal appearance and bowel sounds are normal. He exhibits no distension. There is no tenderness.   Musculoskeletal: He exhibits tenderness and deformity.   Pt debilitated. In wheelchair   Neurological: He is alert and oriented to person, place, and time.   Skin: Skin is warm and dry. Capillary refill takes less than 2 seconds.   Psychiatric: His speech is normal and behavior is normal. Thought content normal. His mood appears anxious. Cognition and memory are normal.   Vitals reviewed.      Assessment:       1. Essential hypertension    2. Spondylosis of lumbar region without myelopathy or radiculopathy        Plan:       Essential hypertension    Spondylosis of lumbar region without myelopathy or radiculopathy            Risks, benefits, and side effects were discussed with the patient. All questions were answered to the fullest satisfaction of the patient, and pt verbalized understanding and agreement to treatment plan. Pt was to call with any new or worsening symptoms, or present to the ER.

## 2019-03-11 DIAGNOSIS — F41.1 GAD (GENERALIZED ANXIETY DISORDER): ICD-10-CM

## 2019-03-11 RX ORDER — DIAZEPAM 10 MG/1
10 TABLET ORAL 2 TIMES DAILY PRN
Qty: 28 TABLET | Refills: 0 | OUTPATIENT
Start: 2019-03-11

## 2019-03-12 ENCOUNTER — TELEPHONE (OUTPATIENT)
Dept: FAMILY MEDICINE | Facility: CLINIC | Age: 64
End: 2019-03-12

## 2019-03-12 DIAGNOSIS — F41.1 GAD (GENERALIZED ANXIETY DISORDER): ICD-10-CM

## 2019-03-12 RX ORDER — DIAZEPAM 10 MG/1
10 TABLET ORAL 2 TIMES DAILY PRN
Qty: 28 TABLET | Refills: 0 | Status: SHIPPED | OUTPATIENT
Start: 2019-03-12 | End: 2019-04-02 | Stop reason: SDUPTHER

## 2019-03-12 NOTE — TELEPHONE ENCOUNTER
Pt is requesting refill on Valium, but this refill request was denied yesterday.  I see a failed UDS from 06/2018, but pt was given a refill f this med in 10/2018.   Please advise, thank you.       ----- Message from Refugio Farley sent at 3/12/2019 10:45 AM CDT -----  Contact: Patient  Advised he needs a refill on:  diazePAM (VALIUM) 10 MG Tab  He is out of the medication since Sunday.  Please call [phone]      [facility] [address] [place] [address], [place] - [address] AT [facility] [address]  [address]  [place] [address]  Phone: [phone] Fax: [phone]

## 2019-03-15 DIAGNOSIS — I10 ESSENTIAL HYPERTENSION: ICD-10-CM

## 2019-03-15 DIAGNOSIS — E87.1 HYPONATREMIA: ICD-10-CM

## 2019-03-15 NOTE — TELEPHONE ENCOUNTER
----- Message from Refugio Farley sent at 3/15/2019  2:52 PM CDT -----  Contact: Patient  Advised he needs a refill and he is out of this medication:  sodium bicarbonate 650 MG tablet  Please call [phone]      [facility] [id number] - [place], [place] - [address] [place] [facility] [place] [address]  [address]  [place] [address]  Phone: [phone] Fax: [phone]

## 2019-03-16 RX ORDER — SODIUM BICARBONATE 650 MG/1
650 TABLET ORAL 4 TIMES DAILY
Qty: 120 TABLET | Refills: 3 | COMMUNITY
Start: 2019-03-16 | End: 2019-04-02 | Stop reason: SDUPTHER

## 2019-04-02 ENCOUNTER — OFFICE VISIT (OUTPATIENT)
Dept: FAMILY MEDICINE | Facility: CLINIC | Age: 64
End: 2019-04-02
Payer: MEDICARE

## 2019-04-02 VITALS
DIASTOLIC BLOOD PRESSURE: 82 MMHG | TEMPERATURE: 97 F | WEIGHT: 128 LBS | BODY MASS INDEX: 20.09 KG/M2 | RESPIRATION RATE: 18 BRPM | HEART RATE: 90 BPM | HEIGHT: 67 IN | SYSTOLIC BLOOD PRESSURE: 160 MMHG | OXYGEN SATURATION: 96 %

## 2019-04-02 DIAGNOSIS — M54.50 CHRONIC BILATERAL LOW BACK PAIN WITHOUT SCIATICA: Primary | ICD-10-CM

## 2019-04-02 DIAGNOSIS — M19.90 ARTHRITIS: ICD-10-CM

## 2019-04-02 DIAGNOSIS — I10 ESSENTIAL HYPERTENSION: ICD-10-CM

## 2019-04-02 DIAGNOSIS — G62.9 NEUROPATHY: ICD-10-CM

## 2019-04-02 DIAGNOSIS — G89.29 CHRONIC BILATERAL LOW BACK PAIN WITHOUT SCIATICA: Primary | ICD-10-CM

## 2019-04-02 DIAGNOSIS — G89.4 CHRONIC PAIN SYNDROME: ICD-10-CM

## 2019-04-02 DIAGNOSIS — F41.1 GAD (GENERALIZED ANXIETY DISORDER): ICD-10-CM

## 2019-04-02 PROCEDURE — 99214 PR OFFICE/OUTPT VISIT, EST, LEVL IV, 30-39 MIN: ICD-10-PCS | Mod: S$PBB,,, | Performed by: NURSE PRACTITIONER

## 2019-04-02 PROCEDURE — 99999 PR PBB SHADOW E&M-EST. PATIENT-LVL III: CPT | Mod: PBBFAC,,, | Performed by: NURSE PRACTITIONER

## 2019-04-02 PROCEDURE — 99213 OFFICE O/P EST LOW 20 MIN: CPT | Mod: PBBFAC,PN | Performed by: NURSE PRACTITIONER

## 2019-04-02 PROCEDURE — 99214 OFFICE O/P EST MOD 30 MIN: CPT | Mod: S$PBB,,, | Performed by: NURSE PRACTITIONER

## 2019-04-02 PROCEDURE — 99999 PR PBB SHADOW E&M-EST. PATIENT-LVL III: ICD-10-PCS | Mod: PBBFAC,,, | Performed by: NURSE PRACTITIONER

## 2019-04-02 RX ORDER — DIAZEPAM 10 MG/1
10 TABLET ORAL 2 TIMES DAILY PRN
Qty: 30 TABLET | Refills: 0 | Status: SHIPPED | OUTPATIENT
Start: 2019-04-02 | End: 2019-09-26

## 2019-04-02 RX ORDER — SODIUM BICARBONATE 650 MG/1
650 TABLET ORAL 3 TIMES DAILY
Qty: 120 TABLET | Refills: 3 | COMMUNITY
Start: 2019-04-02 | End: 2019-04-24

## 2019-04-02 NOTE — PROGRESS NOTES
"Subjective:       Patient ID: Zhao Lee Jr. is a 63 y.o. male.    Chief Complaint: Hypertension    Mr. Lee is a 63 year old male who presents to the clinic today for hypertension evaluation. He reports his BP have not been under control. He reports his BP today at home was 198/99. In regards to the patient's hypertension, patient denies chest pain/sob, and has been compliant with the medication regimen. He reports he has been dealing with increase stress, and has been out of his valium. He reports he is only taking 300 mg of gabapentin BID, but reports he thinks he is confused by the dosage.     Review of Systems   Constitutional: Negative for appetite change, chills, fatigue and fever.   HENT: Negative for congestion, postnasal drip, rhinorrhea and sore throat.    Eyes: Negative for pain and visual disturbance.   Respiratory: Negative for apnea, cough, chest tightness, shortness of breath and wheezing.    Cardiovascular: Negative for chest pain, palpitations and leg swelling.   Genitourinary: Negative for difficulty urinating and dysuria.   Musculoskeletal: Positive for arthralgias, back pain, gait problem, myalgias and neck pain.   Skin: Negative for color change and rash.   Neurological: Negative for dizziness, syncope, weakness, light-headedness and headaches.   Psychiatric/Behavioral: Positive for sleep disturbance. Negative for agitation, confusion, hallucinations and suicidal ideas. The patient is nervous/anxious.          Reviewed family, medical, surgical, and social history.    Objective:      BP (!) 160/82 (BP Location: Right arm, Patient Position: Sitting, BP Method: Medium (Automatic))   Pulse 90   Temp 97.4 °F (36.3 °C) (Tympanic)   Resp 18   Ht 5' 7" (1.702 m)   Wt 58.1 kg (128 lb)   SpO2 96%   BMI 20.05 kg/m²   Physical Exam   Constitutional: He is oriented to person, place, and time. He appears well-developed and well-nourished. He does not have a sickly appearance. No distress. "   HENT:   Head: Normocephalic.   Right Ear: Hearing and external ear normal.   Left Ear: Hearing and external ear normal.   Nose: Nose normal.   Mouth/Throat: Oropharynx is clear and moist and mucous membranes are normal. No uvula swelling. No posterior oropharyngeal erythema.   Eyes: Pupils are equal, round, and reactive to light. Conjunctivae and EOM are normal.   Neck: Normal range of motion. Neck supple.   Cardiovascular: Normal rate, regular rhythm, S1 normal, S2 normal and normal heart sounds.   No murmur heard.  Pulmonary/Chest: No accessory muscle usage or stridor. No tachypnea. He has no decreased breath sounds. He has no wheezes. He has no rhonchi. He has no rales.   Abdominal: Soft. Normal appearance and bowel sounds are normal. He exhibits no distension. There is no tenderness. There is no guarding.   Musculoskeletal: He exhibits tenderness and deformity.   Pt debilitated. In wheelchair   Lymphadenopathy:     He has no cervical adenopathy.   Neurological: He is alert and oriented to person, place, and time.   Skin: Skin is warm and dry. Capillary refill takes less than 2 seconds. No rash noted.   Psychiatric: His speech is normal and behavior is normal. Thought content normal. His mood appears anxious. Cognition and memory are normal.   Vitals reviewed.      Assessment:       1. Chronic bilateral low back pain without sciatica    2. GARETT (generalized anxiety disorder)    3. Essential hypertension    4. Arthritis    5. Neuropathy    6. Chronic pain syndrome        Plan:       Chronic bilateral low back pain without sciatica    GARETT (generalized anxiety disorder)  -     diazePAM (VALIUM) 10 MG Tab; Take 1 tablet (10 mg total) by mouth 2 (two) times daily as needed (anxiety).  Dispense: 30 tablet; Refill: 0    Essential hypertension  -     sodium bicarbonate 650 MG tablet; Take 1 tablet (650 mg total) by mouth 3 (three) times daily.  Dispense: 120 tablet; Refill: 3    Arthritis    Neuropathy    Chronic pain  syndrome          PLAN:  - Discussed with patient the plan of care  - Medications reviewed. Medication side effects discussed. Patient has no questions or concerns at this time. Informed patient to notify me regarding any concerns.   - Continue monitoring and documenting BP in log book; restart valium and gabapentin ; pt to notify me with BP readings if they remain elevated at home  - Will contact [facility] for nurse to monitor and evaluate BP   - gabapentin to be taken 300 BID and 600 mg at night; pt verbalized understanding   - Informed patient to please notify me with any questions or concerns at anytime  - Follow up ordered in May 2019 with Dr Gibson        Risks, benefits, and side effects were discussed with the patient. All questions were answered to the fullest satisfaction of the patient, and pt verbalized understanding and agreement to treatment plan. Pt was to call with any new or worsening symptoms, or present to the ER.

## 2019-04-03 ENCOUNTER — TELEPHONE (OUTPATIENT)
Dept: FAMILY MEDICINE | Facility: CLINIC | Age: 64
End: 2019-04-03

## 2019-04-03 NOTE — TELEPHONE ENCOUNTER
Pt info and orders sent to [facility] to see if they can treat as pt requesting.

## 2019-04-03 NOTE — TELEPHONE ENCOUNTER
I have called [facility].  The patient's aide is covered via [employer] Waiver but nursing is not covered by it and they do not take his insurance.  Would you like to send orders to a different company?  Please advise and thank you, Justine

## 2019-04-03 NOTE — TELEPHONE ENCOUNTER
----- Message from Rachelle Barkley LPN sent at 4/2/2019  4:59 PM CDT -----      ----- Message -----  From: Shima Ford NP  Sent: 4/2/2019   4:29 PM  To: [facility] Staff    Can we see about getting home health nurse to come to his house and check BP and check medications. He reports he has [facility] (aide) coming 3x a week. This would be short term.

## 2019-04-05 ENCOUNTER — TELEPHONE (OUTPATIENT)
Dept: FAMILY MEDICINE | Facility: CLINIC | Age: 64
End: 2019-04-05

## 2019-04-05 RX ORDER — AMLODIPINE BESYLATE 10 MG/1
10 TABLET ORAL DAILY
Qty: 30 TABLET | Refills: 11 | Status: SHIPPED | OUTPATIENT
Start: 2019-04-05 | End: 2019-08-19

## 2019-04-05 NOTE — TELEPHONE ENCOUNTER
Pt states he was told to report his BP readings:  BP:181/97, P: 82  BP:198/102, P: 84  BP: 200/118, P: 86  BP: 190/99, P: 93    Please advise, thank you.     ----- Message from Suzanna Ramsey sent at 4/5/2019 11:29 AM CDT -----  Contact: Zhao pt  Type: Needs Medical Advice    Who Called:  Zhao  Best Call Back Number: [phone]  Additional Information: Pls call pt back regarding his blood pressure. His pressure was 225/112 this morning.  He is having a headache, nausea, and dizziness. Pls call pt regarding.

## 2019-04-15 ENCOUNTER — TELEPHONE (OUTPATIENT)
Dept: PODIATRY | Facility: CLINIC | Age: 64
End: 2019-04-15

## 2019-04-15 NOTE — TELEPHONE ENCOUNTER
----- Message from Bashir Mckeon sent at 4/15/2019  4:10 PM CDT -----  Contact: pt  Type:  Sooner Apoointment Request    Caller is requesting a sooner appointment.  Caller declined first available appointment listed below.  Caller will not accept being placed on the waitlist and is requesting a message be sent to doctor.    Name of Caller:  pt  When is the first available appointment?  4/29/19  Symptoms:  Left foot and leg is swollen, loss of toenails, foot is red, warm to touch   Best Call Back Number:  [phone]  Additional Information:  Pt would like to be seen as soon as possible. Please call to advise.

## 2019-04-24 ENCOUNTER — HOSPITAL ENCOUNTER (OUTPATIENT)
Dept: RADIOLOGY | Facility: HOSPITAL | Age: 64
Discharge: HOME | End: 2019-04-24
Attending: PODIATRIST
Payer: MEDICARE

## 2019-04-24 ENCOUNTER — OFFICE VISIT (OUTPATIENT)
Dept: PODIATRY | Facility: CLINIC | Age: 64
End: 2019-04-24
Payer: MEDICARE

## 2019-04-24 VITALS
SYSTOLIC BLOOD PRESSURE: 167 MMHG | BODY MASS INDEX: 20.4 KG/M2 | HEART RATE: 88 BPM | DIASTOLIC BLOOD PRESSURE: 77 MMHG | TEMPERATURE: 98 F | WEIGHT: 130 LBS | HEIGHT: 67 IN

## 2019-04-24 DIAGNOSIS — G89.4 CHRONIC PAIN SYNDROME: ICD-10-CM

## 2019-04-24 DIAGNOSIS — J44.9 MODERATE CHRONIC OBSTRUCTIVE PULMONARY DISEASE: ICD-10-CM

## 2019-04-24 DIAGNOSIS — G62.9 NEUROPATHY: ICD-10-CM

## 2019-04-24 DIAGNOSIS — L97.521 SKIN ULCER OF LEFT FOOT, LIMITED TO BREAKDOWN OF SKIN: Primary | ICD-10-CM

## 2019-04-24 DIAGNOSIS — L97.521 SKIN ULCER OF LEFT FOOT, LIMITED TO BREAKDOWN OF SKIN: ICD-10-CM

## 2019-04-24 DIAGNOSIS — M19.079 OSTEOARTHRITIS OF ANKLE AND FOOT, UNSPECIFIED LATERALITY: ICD-10-CM

## 2019-04-24 PROCEDURE — 73630 XR FOOT COMPLETE 3 VIEW LEFT: ICD-10-PCS | Mod: 26,LT,, | Performed by: RADIOLOGY

## 2019-04-24 PROCEDURE — 99213 PR OFFICE/OUTPT VISIT, EST, LEVL III, 20-29 MIN: ICD-10-PCS | Mod: S$PBB,,, | Performed by: PODIATRIST

## 2019-04-24 PROCEDURE — 73630 X-RAY EXAM OF FOOT: CPT | Mod: 26,LT,, | Performed by: RADIOLOGY

## 2019-04-24 PROCEDURE — 73630 X-RAY EXAM OF FOOT: CPT | Mod: TC,FY,LT

## 2019-04-24 PROCEDURE — 99213 OFFICE O/P EST LOW 20 MIN: CPT | Mod: S$PBB,,, | Performed by: PODIATRIST

## 2019-04-24 PROCEDURE — 99999 PR PBB SHADOW E&M-EST. PATIENT-LVL III: CPT | Mod: PBBFAC,,, | Performed by: PODIATRIST

## 2019-04-24 PROCEDURE — 99999 PR PBB SHADOW E&M-EST. PATIENT-LVL III: ICD-10-PCS | Mod: PBBFAC,,, | Performed by: PODIATRIST

## 2019-04-24 PROCEDURE — 99213 OFFICE O/P EST LOW 20 MIN: CPT | Mod: PBBFAC,25 | Performed by: PODIATRIST

## 2019-04-24 RX ORDER — GABAPENTIN 600 MG/1
600 TABLET ORAL 3 TIMES DAILY
Qty: 90 TABLET | Refills: 2 | Status: SHIPPED | OUTPATIENT
Start: 2019-04-24 | End: 2019-09-26 | Stop reason: SDUPTHER

## 2019-04-24 RX ORDER — DICLOFENAC SODIUM 10 MG/G
2 GEL TOPICAL 4 TIMES DAILY
Qty: 3 TUBE | Refills: 3 | Status: SHIPPED | OUTPATIENT
Start: 2019-04-24 | End: 2019-05-24

## 2019-04-28 NOTE — PROGRESS NOTES
Subjective:       Patient ID: Zhao Lee Jr. is a 63 y.o. male.    Chief Complaint: Foot Ulcer; Follow-up; and Foot Problem   Patient presents today he has an extensive history of degenerative arthritis peripheral vascular disease and ulceration of the left lower extremity he has had a previous amputation of the right lower extremity.  Patient relates he is having a lot of pain in his left foot he has multiple areas of abrasion and is concerned about infection.  HPI  Review of Systems   Musculoskeletal: Positive for arthralgias, back pain, gait problem, joint swelling and myalgias.   Skin: Positive for wound.   Neurological: Positive for weakness and numbness.   All other systems reviewed and are negative.      Objective:      Physical Exam   Constitutional: He appears well-developed and well-nourished.   Cardiovascular:   Pulses:       Dorsalis pedis pulses are 1+ on the left side.        Posterior tibial pulses are 0 on the left side.   Pulmonary/Chest: Effort normal.   Musculoskeletal: He exhibits deformity.        Left foot: There is decreased range of motion and deformity.   Feet:   Right Foot: amputated  Left Foot:   Protective Sensation: 4 sites tested. 2 sites sensed.   Skin Integrity: Positive for ulcer, skin breakdown, erythema, warmth and dry skin.   Neurological: He is alert.   Skin: Capillary refill takes more than 3 seconds. There is erythema.   Psychiatric: He has a normal mood and affect. His behavior is normal. Judgment and thought content normal.   Nursing note and vitals reviewed.              Assessment:       1. Skin ulcer of left foot, limited to breakdown of skin    2. Osteoarthritis of ankle and foot, unspecified laterality    3. Moderate chronic obstructive pulmonary disease    4. Chronic pain syndrome    5. Neuropathy        Plan:       Patient presents today he was last seen over a year ago he is complaining about abrasions covering the patient's left foot he is well known to me he has  undergone previous amputation of the 4th digit left by me and a partial leg amputation right.  Patient is nonambulatory he is in a wheelchair most of the time he does not recall how he got the abrasions on his left foot these areas were thoroughly cleaned with Dakin solution and a light dressing was applied patient has significant pain out of proportion he is having a lot of nerve pain in the left lower extremity I did increase the patient's gabapentin he was previously taking 600 mg twice a day I have increased him to a 600 mg 3 times a day I have also given the patient a prescription for Voltaren Gel to use on the midfoot area and heel area where he is having a lot of pain and also around the ankle area.  X-rays were taken of the patient's left foot I was concerned about the possibility of osteomyelitis with the patient's history of osteomyelitis no significant signs osteomyelitis were present on plain film x-rays at this time.  Plan follow-up will be as needed patient will be providing us with the name of his home health company he mentioned a couple different names none the were from a year with we need to have that before we can send them orders for wound care on the left lower extremity.  Plan follow-up will be as needed if the gabapentin increased frequency has not working the patient should contact me immediately however he needs to give that gabapentin increase a couple weeks 1st.

## 2019-04-29 ENCOUNTER — TELEPHONE (OUTPATIENT)
Dept: FAMILY MEDICINE | Facility: CLINIC | Age: 64
End: 2019-04-29

## 2019-04-29 NOTE — TELEPHONE ENCOUNTER
Attempted to call pt x3, no answer to voicemail.   -No sooner appts with Dr. Gibson, will suggest available appts with NP.        ----- Message from Dionisio Lara sent at 4/29/2019  9:13 AM CDT -----  Type:  Same Day Appointment Request    Caller is requesting a same day appointment.  Caller declined first available appointment listed below.      Name of Caller:  Patient  When is the first available appointment?  Patient is scheduled on 5/21 but would like to be seen as soon as possible  Symptoms:  Swelling in leg and foot  Best Call Back Number:  [phone]  Additional Information:

## 2019-04-29 NOTE — TELEPHONE ENCOUNTER
----- Message from Bashir Mckeon sent at 4/29/2019 10:43 AM CDT -----  Contact: pt  Type:  Patient Returning Call    Who Called:  pt  Who Left Message for Patient:  Rachelle  Does the patient know what this is regarding?:  appointment  Best Call Back Number:  [phone]  Additional Information:

## 2019-04-29 NOTE — TELEPHONE ENCOUNTER
I have spoken with the patient. He stated that the swelling and pain is in the foot that he saw Dr. Ma for last week.  Thus, I am forwarding this message to his office.  Justine

## 2019-05-30 DIAGNOSIS — Z11.59 NEED FOR HEPATITIS C SCREENING TEST: ICD-10-CM

## 2019-06-14 ENCOUNTER — TELEPHONE (OUTPATIENT)
Dept: FAMILY MEDICINE | Facility: CLINIC | Age: 64
End: 2019-06-14

## 2019-06-14 NOTE — TELEPHONE ENCOUNTER
----- Message from Jelena Moulton sent at 6/14/2019 12:54 PM CDT -----  Type: Needs Verbal order    Who Called:  Karlee with [employer]  Best Call Back Number: [phone]  Additional Information: Calling requesting to do verbal for patient's catheters/stated patient has been out for four days and cath five times daily/please call back to advise.

## 2019-06-14 NOTE — TELEPHONE ENCOUNTER
I have spoken with Kaleigh and given VO for Straight tip intermittent catheters and individual lubricant packets, 5 per day, #450, a 90 day supply, valid for 1 year.  Justine

## 2019-06-28 ENCOUNTER — TELEPHONE (OUTPATIENT)
Dept: FAMILY MEDICINE | Facility: CLINIC | Age: 64
End: 2019-06-28

## 2019-06-28 NOTE — TELEPHONE ENCOUNTER
----- Message from Mago Anguiano sent at 6/28/2019  1:55 PM CDT -----    Type:  Pharmacy Calling to Clarify an RX    Name of Caller: giselle  Pharm[facility] Name:  [facility]  Prescription Name:  len  What do they need to clarify?:   Fax  Was  Sent on June 14 // faxing again  today  Best Call Back Number:  [phone]  Ext [phone]  Additional Information:  Please call  For all details

## 2019-07-13 NOTE — TELEPHONE ENCOUNTER
----- Message from Francy Mcleod sent at 10/23/2018  3:39 PM CDT -----  Contact: patient  Type:  Sooner Apoointment Request    Caller is requesting a sooner appointment.  Caller declined first available appointment listed below.  Caller will not accept being placed on the waitlist and is requesting a message be sent to doctor.    Name of Caller:  Patient  When is the first available appointment?  12/05  Symptoms:    Best Call Back Number:  [phone]  Additional Information:  Requesting a call back to schedule hospital follow up appointment,patient missed appointment yesterday.    negative

## 2019-08-01 ENCOUNTER — TELEPHONE (OUTPATIENT)
Dept: FAMILY MEDICINE | Facility: CLINIC | Age: 64
End: 2019-08-01

## 2019-08-01 NOTE — TELEPHONE ENCOUNTER
----- Message from Lam Powell sent at 8/1/2019 10:18 AM CDT -----  Contact: pt  Type: Needs Medical Advice    Who Called:  pt    Best Call Back Number: [phone]  Additional Information: need orders electric wheelchair sent to [facility]. Please call to advise.   [facility] phone- [phone]

## 2019-08-01 NOTE — TELEPHONE ENCOUNTER
Did he ever have a physical therapy evaluation for the appropriateness of the electric wheelchair?

## 2019-08-05 ENCOUNTER — TELEPHONE (OUTPATIENT)
Dept: FAMILY MEDICINE | Facility: CLINIC | Age: 64
End: 2019-08-05

## 2019-08-05 NOTE — TELEPHONE ENCOUNTER
----- Message from Dara Wilkins sent at 8/5/2019  1:13 PM CDT -----  Type:  Sooner Apoointment Request    Caller is requesting a sooner appointment.  Caller declined first available appointment listed below.  Caller will not accept being placed on the waitlist and is requesting a message be sent to doctor.    Name of Caller:  Self   When is the first available appointment?  10/02/2019 Symptoms:  NA Best Call Back Number: [phone]    Additional Information:  Patient requesting an earlier appointment to be seen for evaluation for electric wheel chair.

## 2019-08-19 ENCOUNTER — OFFICE VISIT (OUTPATIENT)
Dept: FAMILY MEDICINE | Facility: CLINIC | Age: 64
End: 2019-08-19
Payer: MEDICARE

## 2019-08-19 VITALS
BODY MASS INDEX: 20.4 KG/M2 | RESPIRATION RATE: 19 BRPM | OXYGEN SATURATION: 99 % | HEART RATE: 82 BPM | HEIGHT: 67 IN | SYSTOLIC BLOOD PRESSURE: 180 MMHG | WEIGHT: 130 LBS | TEMPERATURE: 98 F | DIASTOLIC BLOOD PRESSURE: 70 MMHG

## 2019-08-19 DIAGNOSIS — E87.1 HYPONATREMIA: ICD-10-CM

## 2019-08-19 DIAGNOSIS — I10 ESSENTIAL HYPERTENSION: ICD-10-CM

## 2019-08-19 DIAGNOSIS — S78.119A UNILATERAL AKA: Primary | ICD-10-CM

## 2019-08-19 PROCEDURE — 3078F DIAST BP <80 MM HG: CPT | Mod: CPTII,S$GLB,, | Performed by: NURSE PRACTITIONER

## 2019-08-19 PROCEDURE — 1101F PR PT FALLS ASSESS DOC 0-1 FALLS W/OUT INJ PAST YR: ICD-10-PCS | Mod: CPTII,S$GLB,, | Performed by: NURSE PRACTITIONER

## 2019-08-19 PROCEDURE — 99213 PR OFFICE/OUTPT VISIT, EST, LEVL III, 20-29 MIN: ICD-10-PCS | Mod: S$GLB,,, | Performed by: NURSE PRACTITIONER

## 2019-08-19 PROCEDURE — 99999 PR PBB SHADOW E&M-EST. PATIENT-LVL III: CPT | Mod: PBBFAC,,, | Performed by: NURSE PRACTITIONER

## 2019-08-19 PROCEDURE — 99213 OFFICE O/P EST LOW 20 MIN: CPT | Mod: S$GLB,,, | Performed by: NURSE PRACTITIONER

## 2019-08-19 PROCEDURE — 3008F PR BODY MASS INDEX (BMI) DOCUMENTED: ICD-10-PCS | Mod: CPTII,S$GLB,, | Performed by: NURSE PRACTITIONER

## 2019-08-19 PROCEDURE — 3008F BODY MASS INDEX DOCD: CPT | Mod: CPTII,S$GLB,, | Performed by: NURSE PRACTITIONER

## 2019-08-19 PROCEDURE — 3077F SYST BP >= 140 MM HG: CPT | Mod: CPTII,S$GLB,, | Performed by: NURSE PRACTITIONER

## 2019-08-19 PROCEDURE — 1101F PT FALLS ASSESS-DOCD LE1/YR: CPT | Mod: CPTII,S$GLB,, | Performed by: NURSE PRACTITIONER

## 2019-08-19 PROCEDURE — 99999 PR PBB SHADOW E&M-EST. PATIENT-LVL III: ICD-10-PCS | Mod: PBBFAC,,, | Performed by: NURSE PRACTITIONER

## 2019-08-19 PROCEDURE — 3077F PR MOST RECENT SYSTOLIC BLOOD PRESSURE >= 140 MM HG: ICD-10-PCS | Mod: CPTII,S$GLB,, | Performed by: NURSE PRACTITIONER

## 2019-08-19 PROCEDURE — 3078F PR MOST RECENT DIASTOLIC BLOOD PRESSURE < 80 MM HG: ICD-10-PCS | Mod: CPTII,S$GLB,, | Performed by: NURSE PRACTITIONER

## 2019-08-19 RX ORDER — LISINOPRIL 20 MG/1
20 TABLET ORAL DAILY
Qty: 90 TABLET | Refills: 3 | Status: SHIPPED | OUTPATIENT
Start: 2019-08-19 | End: 2019-09-26 | Stop reason: SDUPTHER

## 2019-08-19 RX ORDER — CLONIDINE HYDROCHLORIDE 0.1 MG/1
0.1 TABLET ORAL 2 TIMES DAILY
Qty: 180 TABLET | Refills: 2 | Status: SHIPPED | OUTPATIENT
Start: 2019-08-19 | End: 2019-09-26 | Stop reason: SDUPTHER

## 2019-08-19 NOTE — PROGRESS NOTES
"Subjective:       Patient ID: Zhao Lee Jr. is a 63 y.o. male.    Chief Complaint: OTHER (WHEELCHAIR EVAL) and Medication Refill    Mr. Zhao Lee is an established patient in this clinic and primarily sees Dr. Gibson. Today, he presents with request for electric wheelchair and medication refill. In regards to wheelchair, Mr. Valdez is a right BKA. He is currently in standard wheelchair. He shows delayed strength in upper extremities. He reports it is getting harder to push himself around.   in regards to the patient's hypertension, patient denies chest pain/sob, and has not been compliant with the medication regimen due to the following: no reason given. hypertension poorly controlled. Goal of <130/80. Meds and labs as per orders. Patient reports not been taking medication.   Today, he requested refill of valium in office. Informed patient of past drug screen fail and that I will not be able to prescribe patient controlled medications. Patient became very upset. He stated the medical system is a failure. Reported he was going to go home and drink. When I asked Mr. Lee if he understood why I could not prescribe him valium, he stated "I really do not care." I did offer Mr. Lee other medication options. He reported buspar was ineffective for him in the past. He reported that he tried lexapro with no benefits and reported that he did not want to speak to me anymore.             Review of Systems   Constitutional: Negative for appetite change, chills, fatigue and fever.   HENT: Negative for congestion, postnasal drip, rhinorrhea and sore throat.    Eyes: Negative for pain and visual disturbance.   Respiratory: Negative for apnea, cough, chest tightness, shortness of breath and wheezing.    Cardiovascular: Negative for chest pain, palpitations and leg swelling.   Genitourinary: Negative for difficulty urinating and dysuria.   Musculoskeletal: Positive for arthralgias, back pain, gait problem, " "myalgias and neck pain.   Skin: Negative for color change and rash.   Neurological: Negative for dizziness, syncope, weakness, light-headedness and headaches.   Psychiatric/Behavioral: Positive for sleep disturbance. Negative for agitation, confusion, hallucinations and suicidal ideas. The patient is nervous/anxious.          Reviewed family, medical, surgical, and social history.    Objective:      BP (!) 180/70 (BP Location: Left arm, Patient Position: Sitting, BP Method: Medium (Manual))   Pulse 82   Temp 97.6 °F (36.4 °C) (Tympanic)   Resp 19   Ht 5' 7" (1.702 m)   Wt 59 kg (130 lb)   SpO2 99%   BMI 20.36 kg/m²   Physical Exam   Constitutional: He is oriented to person, place, and time. He appears well-developed and well-nourished. He does not have a sickly appearance. No distress.   HENT:   Head: Normocephalic.   Right Ear: Hearing and external ear normal.   Left Ear: Hearing and external ear normal.   Nose: Nose normal.   Mouth/Throat: Oropharynx is clear and moist and mucous membranes are normal. No uvula swelling. No posterior oropharyngeal erythema.   Eyes: Pupils are equal, round, and reactive to light. Conjunctivae and EOM are normal.   Neck: Normal range of motion. Neck supple.   Cardiovascular: Normal rate, regular rhythm, S1 normal, S2 normal and normal heart sounds.   No murmur heard.  Pulmonary/Chest: Effort normal and breath sounds normal. No accessory muscle usage or stridor. No tachypnea. He has no decreased breath sounds. He has no wheezes. He has no rhonchi. He has no rales.   Abdominal: Soft. Normal appearance and bowel sounds are normal. He exhibits no distension. There is no tenderness. There is no guarding.   Musculoskeletal: He exhibits tenderness and deformity.        Right elbow: He exhibits decreased range of motion.        Left elbow: He exhibits decreased range of motion.        Right wrist: He exhibits decreased range of motion.        Left wrist: He exhibits decreased range of " motion.   Patient in standard wheelchair. Decreased strength noted bilaterally in both upper extremities. Muscle tone decreased.  Right foot shows decreased strength and muscle tone.    Feet:   Right Foot: amputated  Lymphadenopathy:     He has no cervical adenopathy.   Neurological: He is alert and oriented to person, place, and time.   Skin: Skin is warm and dry. Capillary refill takes less than 2 seconds. No rash noted.   Psychiatric: His speech is normal. Thought content normal. His mood appears anxious. He is agitated. Cognition and memory are normal.   Vitals reviewed.      Assessment:       1. Unilateral AKA    2. Essential hypertension    3. Hyponatremia        Plan:       Unilateral AKA  -     WHEELCHAIR FOR HOME USE    Essential hypertension  -     lisinopril (PRINIVIL,ZESTRIL) 20 MG tablet; Take 1 tablet (20 mg total) by mouth once daily.  Dispense: 90 tablet; Refill: 3  -     cloNIDine (CATAPRES) 0.1 MG tablet; Take 1 tablet (0.1 mg total) by mouth 2 (two) times daily.  Dispense: 180 tablet; Refill: 2    Hyponatremia  -     cloNIDine (CATAPRES) 0.1 MG tablet; Take 1 tablet (0.1 mg total) by mouth 2 (two) times daily.  Dispense: 180 tablet; Refill: 2          PLAN:  - Discussed with patient the plan of care  - Referral for wheelchair sent to [facility]   - Meds refilled  - Encouraged nurses visit for BP eval in 2 weeks  - Medications reviewed. Medication side effects discussed. Patient has no questions or concerns at this time. Informed patient to notify me regarding any concerns.   - Continue monitoring BP and taking medication as ordered   - Informed patient to please notify me with any questions or concerns at anytime  - Follow up ordered for 1 month       Risks, benefits, and side effects were discussed with the patient. All questions were answered to the fullest satisfaction of the patient, and pt verbalized understanding and agreement to treatment plan. Pt was to call with any new or worsening symptoms,  or present to the ER.

## 2019-08-23 ENCOUNTER — TELEPHONE (OUTPATIENT)
Dept: FAMILY MEDICINE | Facility: CLINIC | Age: 64
End: 2019-08-23

## 2019-08-23 NOTE — TELEPHONE ENCOUNTER
Pt is requesting to receive a power chair from [employer].   Pt was seen on 8.19.19 and ordered a new WC for home use.  Will he need another face-to-face visit for this?  Please advise, thank you.        ----- Message from Dionisio Lara sent at 8/23/2019  2:24 PM CDT -----  Type: Needs Medical Advice    Who Called:  Patient    Best Call Back Number: [phone]  Additional Information:  Patient states that he would like a callback regarding obtaining a power chair from [employer] with no results.

## 2019-08-26 NOTE — TELEPHONE ENCOUNTER
LVM informing pt that the provider has sent all the required paperwork to [facility] and for pt to check with his insurance to see what his next steps are. Advised pt to call the clinic with any further questions or concerns.

## 2019-08-29 ENCOUNTER — TELEPHONE (OUTPATIENT)
Dept: FAMILY MEDICINE | Facility: CLINIC | Age: 64
End: 2019-08-29

## 2019-08-29 NOTE — TELEPHONE ENCOUNTER
----- Message from Dionisio Lara sent at 8/29/2019  1:57 PM CDT -----  Type: Needs Medical Advice    Who Called:  Patient    Best Call Back Number: [phone]  Additional Information: Patient states that he would like a callback regarding whether his order for a power chair was sent to [facility]

## 2019-08-29 NOTE — TELEPHONE ENCOUNTER
Attempted to contact pt, pt VM is full unable to leave message. Wheelchair order was resent to [facility].

## 2019-09-03 ENCOUNTER — TELEPHONE (OUTPATIENT)
Dept: FAMILY MEDICINE | Facility: CLINIC | Age: 64
End: 2019-09-03

## 2019-09-03 NOTE — TELEPHONE ENCOUNTER
Spoke with [facility] and was told that since pt received a power chair in the past that Medicare will not pay for another wheelchair. Pt notified.

## 2019-09-03 NOTE — TELEPHONE ENCOUNTER
Spoke with pt and informed him again that all paperwork has been sent to [facility]. Pt stated he will contact them to see what his next steps should be.

## 2019-09-03 NOTE — TELEPHONE ENCOUNTER
----- Message from Sindi Arizmendi sent at 9/3/2019 11:02 AM CDT -----  Contact: Patient  Type: Needs Medical Advice    Who Called:  Patient  Best Call Back Number:   Additional Information: Calling to find out the status of wheelchair evaluation.

## 2019-09-06 ENCOUNTER — TELEPHONE (OUTPATIENT)
Dept: FAMILY MEDICINE | Facility: CLINIC | Age: 64
End: 2019-09-06

## 2019-09-06 NOTE — TELEPHONE ENCOUNTER
LVM informing pt to call the clinic and reminded him that he and the nurse spoke on Tuesday and he was told at that time that since he had already been issued a power chair in the past that [employer] stated he does not qualify for another power chair. Pt was to reach out to [employer] and his insurance at that time.

## 2019-09-06 NOTE — TELEPHONE ENCOUNTER
----- Message from Suzanna Ramsey sent at 9/6/2019 12:08 PM CDT -----  Contact: Zhao hodgson  Type: Needs Medical Advice    Who Called:  Zhao  Best Call Back Number: [phone]  Additional Information: Pls call pt regarding orders for a power chair. He cannot push a regular wheelchair. He said he has been trying to get in touch w/ the Dr all week but nobody has been calling him back. Pt is requesting for someone to call him back, he will speak to anyone.

## 2019-09-09 ENCOUNTER — TELEPHONE (OUTPATIENT)
Dept: FAMILY MEDICINE | Facility: CLINIC | Age: 64
End: 2019-09-09

## 2019-09-09 NOTE — TELEPHONE ENCOUNTER
----- Message from Kenzie Tobias LPN sent at 9/9/2019  4:56 PM CDT -----  Contact: Zhao pt      ----- Message -----  From: Suzanna Ramsey  Sent: 9/9/2019   4:47 PM  To: Arthur Jessica Staff    Type:  Patient Returning Call    Who Called:  Zhao  Who Left Message for Patient:  Kenzie  Does the patient know what this is regarding?:  Power wheel chair.   Best Call Back Number:  [phone]  Additional Information:  Pt adv'd he already spoke to the insurance company regarding the power chair, They told him that he needs the order sent over and they will pay for it. They are just waiting for the order to be sent to [facility]

## 2019-09-09 NOTE — TELEPHONE ENCOUNTER
----- Message from Maura Zimmerman sent at 9/9/2019 10:55 AM CDT -----  Type: Needs Medical Advice    Who Called:  Patient  Best Call Back Number: [phone] (home)     Additional Information: Says that [facility] did not get the orders for his powered wheelchair. He needs office to fax the orders to them but he did not have the fax number.

## 2019-09-09 NOTE — TELEPHONE ENCOUNTER
----- Message from Kenneth Smith sent at 9/9/2019 10:29 AM CDT -----  Contact: same  Patient called in to check the status of his power chair replacement order that was supposed to be sent over to [facility]?  Patient call back number is [phone]

## 2019-09-09 NOTE — TELEPHONE ENCOUNTER
----- Message from Kenzie Tobias LPN sent at 9/9/2019  4:56 PM CDT -----  Contact: Zhao pt      ----- Message -----  From: Suzanna Ramsey  Sent: 9/9/2019   4:47 PM  To: Arthur Jessica Staff    Type:  Patient Returning Call    Who Called:  Zhao  Who Left Message for Patient:  Kenzie  Does the patient know what this is regarding?:  Power wheel chair.   Best Call Back Number:  [phone]  Additional Information:  Pt adv'd he already spoke to the insurance company regarding the power chair, They told him that he needs the order sent over and they will pay for it. They are just waiting for the order to be sent to [facility]

## 2019-09-09 NOTE — TELEPHONE ENCOUNTER
LVM informing pt again that according to [facility] and his insurance that he does not qualify for a power chair being that he has received one in the past. Pt encouraged to reach out to his insurance company for further assistance with obtaining a power chair.

## 2019-09-19 ENCOUNTER — TELEPHONE (OUTPATIENT)
Dept: FAMILY MEDICINE | Facility: CLINIC | Age: 64
End: 2019-09-19

## 2019-09-19 DIAGNOSIS — S78.119A UNILATERAL AKA: Primary | ICD-10-CM

## 2019-09-19 NOTE — TELEPHONE ENCOUNTER
PT order sent to [facility] with confirmation.   Informed Rosie Junior that order has been sent.        ----- Message from Kylee Pepper sent at 9/19/2019  1:57 PM CDT -----  Type: Needs Medical Advice    Who Called:  Rosie Esquivel  Symptoms (please be specific):  na  How long has patient had these symptoms:  na  Pharmacy name and phone #:  na  Best Call Back Number: [phone]  /  Fax [phone]  Additional Information: received order for power chair/asking if she can get a order for physical therapy wheelchair assessment?/please advise

## 2019-09-26 ENCOUNTER — OFFICE VISIT (OUTPATIENT)
Dept: FAMILY MEDICINE | Facility: CLINIC | Age: 64
End: 2019-09-26
Payer: MEDICARE

## 2019-09-26 ENCOUNTER — HOSPITAL ENCOUNTER (OUTPATIENT)
Dept: RADIOLOGY | Facility: HOSPITAL | Age: 64
Discharge: HOME | End: 2019-09-26
Attending: FAMILY MEDICINE
Payer: MEDICARE

## 2019-09-26 VITALS
RESPIRATION RATE: 15 BRPM | BODY MASS INDEX: 20.4 KG/M2 | WEIGHT: 130 LBS | OXYGEN SATURATION: 97 % | SYSTOLIC BLOOD PRESSURE: 185 MMHG | DIASTOLIC BLOOD PRESSURE: 84 MMHG | HEIGHT: 67 IN | HEART RATE: 81 BPM

## 2019-09-26 DIAGNOSIS — R06.02 SHORTNESS OF BREATH: ICD-10-CM

## 2019-09-26 DIAGNOSIS — R07.9 CHEST PAIN, UNSPECIFIED TYPE: Primary | ICD-10-CM

## 2019-09-26 DIAGNOSIS — I10 ESSENTIAL HYPERTENSION: ICD-10-CM

## 2019-09-26 DIAGNOSIS — E87.1 HYPONATREMIA: ICD-10-CM

## 2019-09-26 DIAGNOSIS — G62.9 NEUROPATHY: ICD-10-CM

## 2019-09-26 DIAGNOSIS — R07.9 CHEST PAIN, UNSPECIFIED TYPE: ICD-10-CM

## 2019-09-26 DIAGNOSIS — I20.0 UNSTABLE ANGINA: ICD-10-CM

## 2019-09-26 DIAGNOSIS — G89.4 CHRONIC PAIN SYNDROME: ICD-10-CM

## 2019-09-26 PROCEDURE — 3077F SYST BP >= 140 MM HG: CPT | Mod: CPTII,S$GLB,, | Performed by: FAMILY MEDICINE

## 2019-09-26 PROCEDURE — 71046 X-RAY EXAM CHEST 2 VIEWS: CPT | Mod: TC,FY

## 2019-09-26 PROCEDURE — 3008F BODY MASS INDEX DOCD: CPT | Mod: CPTII,S$GLB,, | Performed by: FAMILY MEDICINE

## 2019-09-26 PROCEDURE — 99214 PR OFFICE/OUTPT VISIT, EST, LEVL IV, 30-39 MIN: ICD-10-PCS | Mod: S$GLB,,, | Performed by: FAMILY MEDICINE

## 2019-09-26 PROCEDURE — 3008F PR BODY MASS INDEX (BMI) DOCUMENTED: ICD-10-PCS | Mod: CPTII,S$GLB,, | Performed by: FAMILY MEDICINE

## 2019-09-26 PROCEDURE — 1101F PT FALLS ASSESS-DOCD LE1/YR: CPT | Mod: CPTII,S$GLB,, | Performed by: FAMILY MEDICINE

## 2019-09-26 PROCEDURE — 99214 OFFICE O/P EST MOD 30 MIN: CPT | Mod: S$GLB,,, | Performed by: FAMILY MEDICINE

## 2019-09-26 PROCEDURE — 71046 X-RAY EXAM CHEST 2 VIEWS: CPT | Mod: 26,,, | Performed by: RADIOLOGY

## 2019-09-26 PROCEDURE — 3079F PR MOST RECENT DIASTOLIC BLOOD PRESSURE 80-89 MM HG: ICD-10-PCS | Mod: CPTII,S$GLB,, | Performed by: FAMILY MEDICINE

## 2019-09-26 PROCEDURE — 71046 XR CHEST PA AND LATERAL: ICD-10-PCS | Mod: 26,,, | Performed by: RADIOLOGY

## 2019-09-26 PROCEDURE — 3077F PR MOST RECENT SYSTOLIC BLOOD PRESSURE >= 140 MM HG: ICD-10-PCS | Mod: CPTII,S$GLB,, | Performed by: FAMILY MEDICINE

## 2019-09-26 PROCEDURE — 1101F PR PT FALLS ASSESS DOC 0-1 FALLS W/OUT INJ PAST YR: ICD-10-PCS | Mod: CPTII,S$GLB,, | Performed by: FAMILY MEDICINE

## 2019-09-26 PROCEDURE — 3079F DIAST BP 80-89 MM HG: CPT | Mod: CPTII,S$GLB,, | Performed by: FAMILY MEDICINE

## 2019-09-26 RX ORDER — CLONIDINE HYDROCHLORIDE 0.1 MG/1
0.1 TABLET ORAL 2 TIMES DAILY
Qty: 180 TABLET | Refills: 2 | Status: SHIPPED | OUTPATIENT
Start: 2019-09-26 | End: 2020-09-25

## 2019-09-26 RX ORDER — GABAPENTIN 600 MG/1
600 TABLET ORAL 3 TIMES DAILY
Qty: 90 TABLET | Refills: 2 | Status: SHIPPED | OUTPATIENT
Start: 2019-09-26 | End: 2020-01-18

## 2019-09-26 RX ORDER — LISINOPRIL 20 MG/1
20 TABLET ORAL DAILY
Qty: 90 TABLET | Refills: 3 | Status: SHIPPED | OUTPATIENT
Start: 2019-09-26 | End: 2019-11-04 | Stop reason: SDUPTHER

## 2019-09-26 NOTE — PROGRESS NOTES
"  [facility] - Clinic Note    Subjective      Mr. Lee is a 63 y.o. male who presents to clinic for chest pain.    For the last two days pt has had intermittent chest pain. First episode at 3 AM two days ago. Felt like a squeezing, very tight, "truck on his chest that lasted for several minutes.  He had a set up on the side of the bed to make it go away.  He does not have any aspirin or nitroglycerin 0 did not try that at home.  He said eventually went away but he felt like he was going to die was having a heart attack.  The next day he was in his wheelchair rolling around outside and felt the same kind of sensation.  He describes it is just a very very tight heavy feeling around him.  He has no history of a heart attack but does have hypertension and a right AKA.  He has been having shortness of breath intermittently but this is a chronic problem.  He has not seen a cardiologist.  He has a long history of cigarette use and smokes about a pack a day.  In the office today is currently not having chest pain.  New patient and new problems to me    PM Scott has a past medical history of Hypertension.   PSXH Scott has a past surgical history that includes Toe amputation.    Scott's family history is not on file.   SH Scott reports that he has been smoking. He has been smoking about 0.50 packs per day. He has never used smokeless tobacco. He reports that he does not drink alcohol or use drugs.   ALG Scott is allergic to codeine and morphine.   MED Scott has a current medication list which includes the following prescription(s): clonidine, diazepam, gabapentin, lisinopril, and diclofenac sodium.     Review of Systems   Constitutional: Positive for activity change and diaphoresis.   HENT: Negative.    Respiratory: Positive for shortness of breath.    Cardiovascular: Positive for chest pain.   Gastrointestinal: Positive for nausea. Negative for vomiting.   Endocrine: Negative.    Genitourinary: Negative.  " "  Musculoskeletal: Positive for myalgias.   Skin: Negative.    Neurological: Positive for dizziness.   Hematological: Negative.    Psychiatric/Behavioral: Negative.      Objective     BP (!) 185/84 (BP Location: Right arm, Patient Position: Sitting, BP Method: Medium (Automatic))   Pulse 81   Resp 15   Ht 5' 7" (1.702 m)   Wt 59 kg (130 lb)   SpO2 97%   BMI 20.36 kg/m²     Physical Exam   Constitutional:   Appears unwell.  Not diaphoretic or in acute distress   HENT:   Head: Normocephalic and atraumatic.   Right Ear: External ear normal.   Left Ear: External ear normal.   Nose: Nose normal.   Mouth/Throat: Oropharynx is clear and moist.   Eyes: Conjunctivae are normal.   Cardiovascular: Normal rate, regular rhythm, normal heart sounds and intact distal pulses.   No murmur heard.  Pulmonary/Chest:   Increased work of breathing at baseline.  Audible wheezes with conversation.  Scattered wheezes throughout all lung fields.  No rales.  No respiratory distress.  Moderate air movement.   Abdominal: Soft. Bowel sounds are normal.   Musculoskeletal:   Right AKA   Lymphadenopathy:     He has no cervical adenopathy.   Neurological: He is alert.   Skin:   Dusky skin   Vitals reviewed.    Assessment/Plan     1. Chest pain, unspecified type  CBC auto differential    X-Ray Chest PA And Lateral   2. Essential hypertension  lisinopril (PRINIVIL,ZESTRIL) 20 MG tablet    cloNIDine (CATAPRES) 0.1 MG tablet    Comprehensive metabolic panel    Lipid panel    Hemoglobin A1c   3. Hyponatremia     4. Chronic pain syndrome  gabapentin (NEURONTIN) 600 MG tablet    Uncontrolled. Pt is off most medications. Refill gabapentin.    5. Neuropathy  gabapentin (NEURONTIN) 600 MG tablet    Chronic. Uncontrolled. Refill gabapentin   6. Shortness of breath  CBC auto differential    X-Ray Chest PA And Lateral   7. Unstable angina  Ambulatory referral to Cardiology     Patient currently not having chest pain.  Spoke with  who is willing to " see him today given the severity of patient's symptoms.  His symptoms do sound cardiac in nature.  Patient instructed on where to go.  Appointment made for 1:00 p.m..    Will need close follow-up as patient is at high risk for adverse events.  Follow up in about 2 weeks (around 10/10/2019) for htn, chest pain.    Future Appointments   Date Time Provider Department Center   10/10/2019  8:20 AM Rachelle Cuadra MD [facility]       Rachelle Cuadra MD  Family Medicine  [facility]  [phone]

## 2019-09-27 ENCOUNTER — TELEPHONE (OUTPATIENT)
Dept: FAMILY MEDICINE | Facility: CLINIC | Age: 64
End: 2019-09-27

## 2019-09-27 NOTE — TELEPHONE ENCOUNTER
Pt states he went to Dr. Reis office yesterday for cardiology appt.  Asked to fax referral to Dr. Chaves's office so that he can schedule appt with him,  Referral faxed to number provided, [phone].

## 2019-09-27 NOTE — TELEPHONE ENCOUNTER
----- Message from Rekha Drummond sent at 9/27/2019 12:03 PM CDT -----  Contact: Patient  Type: Needs Medical Advice    Who Called:  Patient  Best Call Back Number:   Additional Information: Calling to request that the referral for Cardiologist, Johnny Liang, be faxed to

## 2019-09-30 ENCOUNTER — TELEPHONE (OUTPATIENT)
Dept: FAMILY MEDICINE | Facility: CLINIC | Age: 64
End: 2019-09-30

## 2019-09-30 NOTE — TELEPHONE ENCOUNTER
----- Message from Rachelle Cuadra MD sent at 9/27/2019  4:54 PM CDT -----  Please let Mr. Lee know that he needs to schedule an appointment for very close follow up with me or Dr. Gibson to review lab work.

## 2019-10-02 ENCOUNTER — TELEPHONE (OUTPATIENT)
Dept: FAMILY MEDICINE | Facility: CLINIC | Age: 64
End: 2019-10-02

## 2019-10-02 NOTE — TELEPHONE ENCOUNTER
----- Message from Jill Mckoy sent at 10/1/2019 10:33 AM CDT -----  Contact: [facility] [phone]  She received a referral, but she needs demographics, office notes and labs to be faxed to [phone]. Thank you!

## 2019-10-04 ENCOUNTER — OFFICE VISIT (OUTPATIENT)
Dept: FAMILY MEDICINE | Facility: CLINIC | Age: 64
End: 2019-10-04
Payer: MEDICARE

## 2019-10-04 VITALS
BODY MASS INDEX: 20.4 KG/M2 | WEIGHT: 130 LBS | DIASTOLIC BLOOD PRESSURE: 72 MMHG | RESPIRATION RATE: 19 BRPM | HEART RATE: 87 BPM | SYSTOLIC BLOOD PRESSURE: 163 MMHG | OXYGEN SATURATION: 97 % | HEIGHT: 67 IN

## 2019-10-04 DIAGNOSIS — R45.4 IRRITABILITY: ICD-10-CM

## 2019-10-04 DIAGNOSIS — F30.8 HYPOMANIA: ICD-10-CM

## 2019-10-04 DIAGNOSIS — G47.9 SLEEP DISTURBANCE: Primary | ICD-10-CM

## 2019-10-04 DIAGNOSIS — F41.9 ANXIETY: ICD-10-CM

## 2019-10-04 DIAGNOSIS — M62.838 MUSCLE SPASM: ICD-10-CM

## 2019-10-04 PROCEDURE — 1101F PR PT FALLS ASSESS DOC 0-1 FALLS W/OUT INJ PAST YR: ICD-10-PCS | Mod: CPTII,S$GLB,, | Performed by: FAMILY MEDICINE

## 2019-10-04 PROCEDURE — 3078F DIAST BP <80 MM HG: CPT | Mod: CPTII,S$GLB,, | Performed by: FAMILY MEDICINE

## 2019-10-04 PROCEDURE — 99214 OFFICE O/P EST MOD 30 MIN: CPT | Mod: S$GLB,,, | Performed by: FAMILY MEDICINE

## 2019-10-04 PROCEDURE — 3077F PR MOST RECENT SYSTOLIC BLOOD PRESSURE >= 140 MM HG: ICD-10-PCS | Mod: CPTII,S$GLB,, | Performed by: FAMILY MEDICINE

## 2019-10-04 PROCEDURE — 99214 PR OFFICE/OUTPT VISIT, EST, LEVL IV, 30-39 MIN: ICD-10-PCS | Mod: S$GLB,,, | Performed by: FAMILY MEDICINE

## 2019-10-04 PROCEDURE — 3077F SYST BP >= 140 MM HG: CPT | Mod: CPTII,S$GLB,, | Performed by: FAMILY MEDICINE

## 2019-10-04 PROCEDURE — 1101F PT FALLS ASSESS-DOCD LE1/YR: CPT | Mod: CPTII,S$GLB,, | Performed by: FAMILY MEDICINE

## 2019-10-04 PROCEDURE — 3078F PR MOST RECENT DIASTOLIC BLOOD PRESSURE < 80 MM HG: ICD-10-PCS | Mod: CPTII,S$GLB,, | Performed by: FAMILY MEDICINE

## 2019-10-04 PROCEDURE — 3008F PR BODY MASS INDEX (BMI) DOCUMENTED: ICD-10-PCS | Mod: CPTII,S$GLB,, | Performed by: FAMILY MEDICINE

## 2019-10-04 PROCEDURE — 3008F BODY MASS INDEX DOCD: CPT | Mod: CPTII,S$GLB,, | Performed by: FAMILY MEDICINE

## 2019-10-04 RX ORDER — TEMAZEPAM 15 MG/1
15 CAPSULE ORAL NIGHTLY PRN
Qty: 30 CAPSULE | Refills: 2 | Status: SHIPPED | OUTPATIENT
Start: 2019-10-04 | End: 2019-11-03

## 2019-10-04 RX ORDER — TRAZODONE HYDROCHLORIDE 50 MG/1
50-100 TABLET ORAL NIGHTLY
Qty: 60 TABLET | Refills: 11 | Status: SHIPPED | OUTPATIENT
Start: 2019-10-04 | End: 2020-10-03

## 2019-10-04 RX ORDER — CYCLOBENZAPRINE HCL 10 MG
10 TABLET ORAL 3 TIMES DAILY PRN
Qty: 60 TABLET | Refills: 2 | Status: SHIPPED | OUTPATIENT
Start: 2019-10-04 | End: 2019-10-14

## 2019-10-04 RX ORDER — ARIPIPRAZOLE 10 MG/1
10 TABLET ORAL DAILY
Qty: 30 TABLET | Refills: 11 | Status: SHIPPED | OUTPATIENT
Start: 2019-10-04 | End: 2020-10-03

## 2019-10-04 NOTE — PROGRESS NOTES
"  [facility] - Clinic Note    Subjective      Mr. Lee is a 63 y.o. male who presents to clinic for follow up.      Sleep disturbance  Anxiety  Irritability  Erratic behavior  Chronic pain  Patient complaining of all of these problems.  Seen in all of these problems are worsening.  Reports that he has been going to pain management but wants to stop because no one listens to him, no one is controlling his pain.  Continues to say that he is going to just have to get heroin" if he does not get more pain medicine.  He is very anxious and irritable.  A sister-in-law is with him who reports that she has noticed increasing worsening of the symptoms.  At last visit he was here because of chest pain which still continues.  He does have follow-up with Cardiology though he missed the appointment was scheduled for him at last visit because of a confusion about location.  He is only sleeping for 1-2 hours a night because of pain and racing thoughts.  Reports in the visit that he would     [facility] Scott has a past medical history of Hypertension.   PSX Scott has a past surgical history that includes Toe amputation.    Scott's family history is not on file.    Scott reports that he has been smoking. He has been smoking about 0.50 packs per day. He has never used smokeless tobacco. He reports that he does not drink alcohol or use drugs.   [facility] Scott is allergic to codeine and morphine.   MED Scott has a current medication list which includes the following prescription(s): clonidine, gabapentin, lisinopril, aripiprazole, cyclobenzaprine, diazepam, diclofenac sodium, temazepam, and trazodone.     Review of Systems   Constitutional: Negative for unexpected weight change.   HENT: Positive for congestion.    Eyes: Negative.    Respiratory: Positive for cough, shortness of breath and wheezing.    Cardiovascular: Positive for chest pain (not currently ).   Gastrointestinal: Positive for nausea. Negative for vomiting. " "  Musculoskeletal: Positive for arthralgias.        "all over pain"   Psychiatric/Behavioral: Positive for agitation, behavioral problems, dysphoric mood and sleep disturbance. Negative for hallucinations, self-injury and suicidal ideas (passive). The patient is nervous/anxious and is hyperactive.    ROS otherwise negative  Objective     BP (!) 163/72 (BP Location: Left arm, Patient Position: Sitting, BP Method: Medium (Automatic))   Pulse 87   Resp 19   Ht 5' 7" (1.702 m)   Wt 59 kg (130 lb)   SpO2 97%   BMI 20.36 kg/m²     Physical Exam   Constitutional: He is well-developed, well-nourished, and in no distress. No distress.   Slightly disheveled, smells of cigarettes, in a wheelchair   HENT:   Head: Normocephalic and atraumatic.   Eyes: Conjunctivae are normal.   Cardiovascular: Normal rate and regular rhythm.   Pulmonary/Chest:   Increased work of breathing, scattered rales, biphasic wheezes   Abdominal: Soft.   Lymphadenopathy:     He has no cervical adenopathy.   Skin:   Dusky, warm   Psychiatric: Memory normal. His mood appears anxious. His affect is blunt and inappropriate (borderline). He is agitated. He expresses impulsivity. He exhibits a depressed mood. He expresses no suicidal plans and no homicidal plans.   Patient is very hyperactive, having difficulty staying on target, very agitated and irritable. Reports wanting to just punch people. No SI/HI   Vitals reviewed.    Assessment/Plan     1. Sleep disturbance  traZODone (DESYREL) 50 MG tablet    temazepam (RESTORIL) 15 mg Cap   2. Muscle spasm  cyclobenzaprine (FLEXERIL) 10 MG tablet   3. Anxiety  ARIPiprazole (ABILIFY) 10 MG Tab    temazepam (RESTORIL) 15 mg Cap   4. Irritability  ARIPiprazole (ABILIFY) 10 MG Tab   5. Hypomania  ARIPiprazole (ABILIFY) 10 MG Tab       Will change from xanax/klonopin to restoril for anxiety/sleep. Add trazodone. Start Abilify for possible h/o bipolar/trisha - has done poorly with certain SSRIs.   Follow up " closely.  Flexeril for muscle spasms  Continues to seek pain medicine - is interested in pain management referral.       Future Appointments   Date Time Provider Department Center   11/4/2019 10:00 AM Rachelle Cuadra MD [facility]       Rachelle Cuadra MD  Family Medicine  [facility]  [phone]

## 2019-10-08 RX ORDER — ALBUTEROL SULFATE 0.83 MG/ML
SOLUTION RESPIRATORY (INHALATION)
Qty: 360 ML | Refills: 0 | OUTPATIENT
Start: 2019-10-08

## 2019-10-08 RX ORDER — IPRATROPIUM BROMIDE AND ALBUTEROL SULFATE 2.5; .5 MG/3ML; MG/3ML
3 SOLUTION RESPIRATORY (INHALATION) EVERY 6 HOURS PRN
Qty: 1 BOX | Refills: 11 | Status: SHIPPED | OUTPATIENT
Start: 2019-10-08 | End: 2020-03-18

## 2019-10-10 ENCOUNTER — TELEPHONE (OUTPATIENT)
Dept: FAMILY MEDICINE | Facility: CLINIC | Age: 64
End: 2019-10-10

## 2019-10-10 DIAGNOSIS — J44.9 COPD MIXED TYPE: Primary | ICD-10-CM

## 2019-10-10 NOTE — TELEPHONE ENCOUNTER
Called it in. Please advise that he should not use this with his albuterol inhaler because it already has albuterol in it.

## 2019-10-10 NOTE — TELEPHONE ENCOUNTER
----- Message from Tsering Brower sent at 10/9/2019  4:41 PM CDT -----  Contact: Zhao  Type: Needs Medical Advice    Who Called:  patient  Pharmacy name and phone #:    [id number] - [place], [place] - [address] [facility] [address]  [address]  [place] [address]  Phone: [phone] Fax: [phone]    Best Call Back Number: [phone]  Additional Information: requesting a call back regarding a rescue inhaler--wants to know if he can get it called in for when he isn't in the home or around the nebulizer--please advise--thank you

## 2019-10-16 NOTE — TELEPHONE ENCOUNTER
Notified patient of MD advisement of use of inhaler. Patient verbalized understanding and voiced no other concerns at this time.

## 2019-11-04 ENCOUNTER — OFFICE VISIT (OUTPATIENT)
Dept: FAMILY MEDICINE | Facility: CLINIC | Age: 64
End: 2019-11-04
Payer: MEDICARE

## 2019-11-04 VITALS
OXYGEN SATURATION: 98 % | BODY MASS INDEX: 20.4 KG/M2 | SYSTOLIC BLOOD PRESSURE: 157 MMHG | RESPIRATION RATE: 15 BRPM | TEMPERATURE: 98 F | HEART RATE: 76 BPM | HEIGHT: 67 IN | WEIGHT: 130 LBS | DIASTOLIC BLOOD PRESSURE: 82 MMHG

## 2019-11-04 DIAGNOSIS — N18.4 STAGE 4 CHRONIC KIDNEY DISEASE: ICD-10-CM

## 2019-11-04 DIAGNOSIS — G47.9 SLEEP DISTURBANCE: ICD-10-CM

## 2019-11-04 DIAGNOSIS — I10 ESSENTIAL HYPERTENSION: ICD-10-CM

## 2019-11-04 DIAGNOSIS — D53.9 MACROCYTIC ANEMIA: Primary | ICD-10-CM

## 2019-11-04 DIAGNOSIS — F30.8 HYPOMANIA: ICD-10-CM

## 2019-11-04 DIAGNOSIS — R45.4 IRRITABILITY: ICD-10-CM

## 2019-11-04 PROCEDURE — 1101F PR PT FALLS ASSESS DOC 0-1 FALLS W/OUT INJ PAST YR: ICD-10-PCS | Mod: CPTII,S$GLB,, | Performed by: FAMILY MEDICINE

## 2019-11-04 PROCEDURE — 3008F BODY MASS INDEX DOCD: CPT | Mod: CPTII,S$GLB,, | Performed by: FAMILY MEDICINE

## 2019-11-04 PROCEDURE — 99214 PR OFFICE/OUTPT VISIT, EST, LEVL IV, 30-39 MIN: ICD-10-PCS | Mod: S$GLB,,, | Performed by: FAMILY MEDICINE

## 2019-11-04 PROCEDURE — 3079F PR MOST RECENT DIASTOLIC BLOOD PRESSURE 80-89 MM HG: ICD-10-PCS | Mod: CPTII,S$GLB,, | Performed by: FAMILY MEDICINE

## 2019-11-04 PROCEDURE — 3008F PR BODY MASS INDEX (BMI) DOCUMENTED: ICD-10-PCS | Mod: CPTII,S$GLB,, | Performed by: FAMILY MEDICINE

## 2019-11-04 PROCEDURE — 1101F PT FALLS ASSESS-DOCD LE1/YR: CPT | Mod: CPTII,S$GLB,, | Performed by: FAMILY MEDICINE

## 2019-11-04 PROCEDURE — 99214 OFFICE O/P EST MOD 30 MIN: CPT | Mod: S$GLB,,, | Performed by: FAMILY MEDICINE

## 2019-11-04 PROCEDURE — 3079F DIAST BP 80-89 MM HG: CPT | Mod: CPTII,S$GLB,, | Performed by: FAMILY MEDICINE

## 2019-11-04 PROCEDURE — 3077F SYST BP >= 140 MM HG: CPT | Mod: CPTII,S$GLB,, | Performed by: FAMILY MEDICINE

## 2019-11-04 PROCEDURE — 3077F PR MOST RECENT SYSTOLIC BLOOD PRESSURE >= 140 MM HG: ICD-10-PCS | Mod: CPTII,S$GLB,, | Performed by: FAMILY MEDICINE

## 2019-11-04 RX ORDER — AMLODIPINE BESYLATE 5 MG/1
TABLET ORAL
Refills: 2 | COMMUNITY
Start: 2019-10-03

## 2019-11-04 RX ORDER — HYDRALAZINE HYDROCHLORIDE 50 MG/1
TABLET, FILM COATED ORAL
Refills: 2 | COMMUNITY
Start: 2019-10-03

## 2019-11-04 RX ORDER — DOXYCYCLINE HYCLATE 100 MG
TABLET ORAL
Refills: 0 | COMMUNITY
Start: 2019-10-25

## 2019-11-04 RX ORDER — CLOPIDOGREL BISULFATE 75 MG/1
TABLET ORAL
Refills: 2 | COMMUNITY
Start: 2019-10-03

## 2019-11-04 RX ORDER — SODIUM BICARBONATE 650 MG/1
TABLET ORAL
Refills: 0 | COMMUNITY
Start: 2019-10-03

## 2019-11-04 RX ORDER — ISOSORBIDE MONONITRATE 30 MG/1
TABLET, EXTENDED RELEASE ORAL
Refills: 0 | COMMUNITY
Start: 2019-10-03

## 2019-11-04 RX ORDER — ASPIRIN 81 MG/1
TABLET ORAL
Refills: 2 | COMMUNITY
Start: 2019-10-03

## 2019-11-04 RX ORDER — LISINOPRIL 40 MG/1
40 TABLET ORAL DAILY
Qty: 90 TABLET | Refills: 3 | Status: SHIPPED | OUTPATIENT
Start: 2019-11-04 | End: 2020-11-03

## 2019-11-04 RX ORDER — DOXYCYCLINE 100 MG/1
CAPSULE ORAL
Refills: 0 | COMMUNITY
Start: 2019-10-03

## 2019-11-04 RX ORDER — FUROSEMIDE 20 MG/1
TABLET ORAL
Refills: 3 | COMMUNITY
Start: 2019-10-25

## 2019-11-04 NOTE — PROGRESS NOTES
[facility] - Clinic Note    Subjective      Mr. Lee is a 63 y.o. male who presents to clinic for follow up.    Sleep disturbance  - still having problems with sleep.  He is waking up throughout the night  Has had to take medicine for kidney infections. Has a neurogenic bladder.  This is well can open the past before.  He is not taking trazodone as it is prescribed because he says he has side effects from this medicine.  Says that he will be extremely groggy throughout the day.    Is taking Abilify as prescribed.  Family members note that his mood is improved.  He notes that he is having some improvement in mood with his well.    Was evaluated by hospice.  He was told that he was not eligible for hospice at this time but they are coming to re-evaluate soon.    Has an appointment with Dr. Liang, Cardiologist.  Has chest pain and shortness of breath at baseline.    Has seen a Nephrologist - Dr. Navarrete. Kidneys are worsening.     Had a Urologist in [place], [place].    Not at goal for hypertension.    [facility] Scott has a past medical history of Hypertension.   PSX Scott has a past surgical history that includes Toe amputation.    Scott's family history is not on file.    Scott reports that he has been smoking. He has been smoking about 0.50 packs per day. He has never used smokeless tobacco. He reports that he does not drink alcohol or use drugs.   [facility] Scott is allergic to codeine and morphine.   ALBERTA Chavez has a current medication list which includes the following prescription(s): albuterol-ipratropium, amlodipine, aripiprazole, aspirin, clonidine, clopidogrel, doxycycline, furosemide, hydralazine, ipratropium-albuterol, isosorbide mononitrate, lisinopril, sodium bicarbonate, trazodone, diazepam, diclofenac sodium, doxycycline, and gabapentin.     Review of Systems   Respiratory: Positive for shortness of breath.    Cardiovascular: Positive for chest pain.   Genitourinary: Positive for difficulty  "urinating.   Psychiatric/Behavioral: Positive for sleep disturbance. The patient is nervous/anxious (improving).      Objective     BP (!) 157/82 (BP Location: Right arm, Patient Position: Sitting, BP Method: Medium (Automatic))   Pulse 76   Temp 97.9 °F (36.6 °C) (Oral)   Resp 15   Ht 5' 7" (1.702 m)   Wt 59 kg (130 lb)   SpO2 98%   BMI 20.36 kg/m²     Physical Exam   Constitutional:   Sitting in wheelchair, appears older than stated age   HENT:   Head: Normocephalic and atraumatic.   Right Ear: External ear normal.   Left Ear: External ear normal.   Nose: Nose normal.   Poor dentition   Eyes: Conjunctivae are normal.   Cardiovascular: Intact distal pulses.   Distant heart sounds, no m/r/g   Pulmonary/Chest:   Increased work of breathing, productive cough, scattered wheezes, poor air movement   Psychiatric:   Mood is "good." affect is congruent with mood. Smiling occasionally. Judgment is normal. No erratic or irrational behavior. Speech is normal.   Vitals reviewed.    Assessment/Plan     1. Macrocytic anemia  Vitamin B12    Folate   2. Stage 4 chronic kidney disease  Basic metabolic panel   3. Essential hypertension  lisinopril (PRINIVIL,ZESTRIL) 40 MG tablet   4. Sleep disturbance     5. Hypomania     6. Irritability       Increase lisinopril to 40 mg daily.  Change trazodone to 25 mg nightly to see if this helps with side effects  Continue Abilify  Emphasized the importance of follow up with a nephrologist - appointment with Dr. Navarrete    Needs close follow up. Medical conditions are severe and unstable.        Rachelle Cuadra MD  Family Medicine  [facility]  [phone]    "

## 2020-01-18 DIAGNOSIS — G89.4 CHRONIC PAIN SYNDROME: ICD-10-CM

## 2020-01-18 DIAGNOSIS — G62.9 NEUROPATHY: ICD-10-CM

## 2020-01-18 RX ORDER — GABAPENTIN 600 MG/1
TABLET ORAL
Qty: 90 TABLET | Refills: 2 | Status: SHIPPED | OUTPATIENT
Start: 2020-01-18

## 2020-01-31 ENCOUNTER — TELEPHONE (OUTPATIENT)
Dept: FAMILY MEDICINE | Facility: CLINIC | Age: 65
End: 2020-01-31

## 2020-01-31 NOTE — TELEPHONE ENCOUNTER
Paperwork signed by MD and sent with confirmation.         ----- Message from Lam Powell sent at 1/31/2020  3:34 PM CST -----  Contact: Rosie De Guzman  Type: Needs Medical Advice    Who Called:  Rosie    Maikol Call Back Number: [phone]  Fax: [phone]  Additional Information: Calling to follow up on a fax she sent over for the pt's power wheelchair. Faxed paperwork on 01/27/2020. Forms are Pt evaluation form and detailed product description.   Please call to advise.

## 2020-02-10 ENCOUNTER — TELEPHONE (OUTPATIENT)
Dept: FAMILY MEDICINE | Facility: CLINIC | Age: 65
End: 2020-02-10

## 2020-02-10 NOTE — TELEPHONE ENCOUNTER
Rosie with [facility] states that one paper was missing that was on 01.31.2020.  States she will refax to the direct  nursing fax number.         ----- Message from Refugio Farley sent at 2/10/2020  9:47 AM CST -----  Contact: Rosie with [facility] [phone] Fax [phone]  Type: Needs Medical Advice    Who Called:  Rosie with [facility] [phone]  Fax [phone]    Additional Information: Advised ptnt needs a wheelchair.    Advised needs th PT notes on the PT evaluation form sent on to the office earlier this month.  Please call with questions.

## 2020-03-18 RX ORDER — IPRATROPIUM BROMIDE AND ALBUTEROL SULFATE 2.5; .5 MG/3ML; MG/3ML
SOLUTION RESPIRATORY (INHALATION)
Qty: 180 ML | Refills: 3 | Status: SHIPPED | OUTPATIENT
Start: 2020-03-18

## 2021-01-07 DIAGNOSIS — I10 ESSENTIAL HYPERTENSION: ICD-10-CM

## 2024-04-09 NOTE — TELEPHONE ENCOUNTER
----- Message from Jill Inmanza sent at 10/15/2018 11:05 AM CDT -----  Contact: self [phone]  He will be near your office on 10/18 at 11 am.  He would like to also see you, he is experiencing a lot of pain since his fall.  I have scheduled your first available for 10/17 but 10/18 would be more convenient for him.  Please call him.  Thank you!   P/w CP, SOB i/s/o running out of meds   - Clinically euvolemic   - EKG:  sinus tach  - Trop: 49 > 62     BNP: 647  - CTa chest: no PE. b/L hilar LAD iso sarcoid hx  - S/p IV bumex 2mg in ED  >> c/w home med Bumex 1mg PO qd      - I&O, daily wt   - telemetry   - Echo (7/2023): decreased LVSF EF 17%  - Repeat echo (ordered)  - Appreciate HF input, f/u for further rec cardio  - C/w GDMT: Entresto, coreg, Atorvastatin, Bumex